# Patient Record
Sex: FEMALE | Race: WHITE | NOT HISPANIC OR LATINO | Employment: FULL TIME | URBAN - METROPOLITAN AREA
[De-identification: names, ages, dates, MRNs, and addresses within clinical notes are randomized per-mention and may not be internally consistent; named-entity substitution may affect disease eponyms.]

---
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
## 2018-01-02 ENCOUNTER — OFFICE VISIT (OUTPATIENT)
Dept: OBGYN | Facility: CLINIC | Age: 18
End: 2018-01-02
Payer: COMMERCIAL

## 2018-01-02 VITALS
DIASTOLIC BLOOD PRESSURE: 83 MMHG | TEMPERATURE: 98 F | SYSTOLIC BLOOD PRESSURE: 135 MMHG | HEART RATE: 86 BPM | WEIGHT: 225 LBS | BODY MASS INDEX: 35.77 KG/M2

## 2018-01-02 DIAGNOSIS — Z30.09 ENCOUNTER FOR COUNSELING REGARDING CONTRACEPTION: Primary | ICD-10-CM

## 2018-01-02 PROCEDURE — 99203 OFFICE O/P NEW LOW 30 MIN: CPT | Performed by: OBSTETRICS & GYNECOLOGY

## 2018-01-02 NOTE — MR AVS SNAPSHOT
After Visit Summary   1/2/2018    Sivan Mathew    MRN: [id number]           Patient Information     Date Of Birth          2000        Visit Information        Provider Department      1/2/2018 11:30 AM Catherine Fitzpatrick MD [facility]        Today's Diagnoses     Encounter for counseling regarding contraception    -  1       Follow-ups after your visit        Who to contact     If you have questions or need follow up information about today's clinic visit or your schedule please contact [facility] directly at [phone].  Normal or non-critical lab and imaging results will be communicated to you by Twelixirhart, letter or phone within 4 business days after the clinic has received the results. If you do not hear from us within 7 days, please contact the clinic through [employer] or phone. If you have a critical or abnormal lab result, we will notify you by phone as soon as possible.  Submit refill requests through [employer] or call your pharmacy and they will forward the refill request to us. Please allow 3 business days for your refill to be completed.          Additional Information About Your Visit        MyChart Information     [employer] gives you secure access to your electronic health record. If you see a primary care provider, you can also send messages to your care team and make appointments. If you have questions, please call your primary care clinic.  If you do not have a primary care provider, please call [phone] and they will assist you.        Care EveryWhere ID     This is your Care EveryWhere ID. This could be used by other organizations to access your [facility] medical records  [id number]        Your Vitals Were     Pulse Temperature Last Period BMI (Body Mass Index)          86 98  F (36.7  C) (Oral) 12/28/2017 35.77 kg/m2         Blood Pressure from Last 3 Encounters:   01/02/18 135/83   09/09/17 126/87   03/16/15 116/70    Weight from Last 3  Encounters:   01/02/18 225 lb (102.1 kg) (99 %)*   03/16/15 203 lb 2 oz (92.1 kg) (98 %)*   10/17/14 187 lb (84.8 kg) (97 %)*     * Growth percentiles are based on [facility] 2-20 Years data.              Today, you had the following     No orders found for display       Primary Care Provider Office Phone # Fax #    Daly Caridad Rodriges PA-C [phone] [address] [facility] [address]        Equal Access to Services     [facility]rem[facility] HospitalLINDA : Hadii silas beckman hadasho Sorajesh, waaxda luqadaha, qaybta kaalmada adehadley, edel boyle . So [facility] [phone].    ATENCIÓN: Si habla español, tiene a gonzalez disposición servicios gratuitos de asistencia lingüística. LlTriHealth [phone].    We comply with applicable federal civil rights laws and [place] laws. We do not discriminate on the basis of race, color, national origin, age, disability, sex, sexual orientation, or gender identity.            Thank you!     Thank you for choosing [facility]  for your care. Our goal is always to provide you with excellent care. Hearing back from our patients is one way we can continue to improve our services. Please take a few minutes to complete the written survey that you may receive in the mail after your visit with us. Thank you!             Your Updated Medication List - Protect others around you: Learn how to safely use, store and throw away your medicines at www.disposemymeds.org.      Notice  As of 1/2/2018 11:59 PM    You have not been prescribed any medications.

## 2018-01-02 NOTE — PROGRESS NOTES
"GYN Clinic Visit    Date of visit: 2018     Chief Complaint:   Chief Complaint   Patient presents with     Consult     contraception       HPI:   Sivan Mathew is a 17 year old  who presents to clinic today to discuss contraception options    Sivan is looking mainly for cycle control, as she's not currently sexually active.  Thinking she's most interested in IUD, Nexplanon.    Reports her periods are \"horrible.\"  She bleeds every 2-3 weeks, lasting 6-7 days.  The first day she has horrible cramps and reports she has difficulty doing her normal daily activities.      Medications:    No current outpatient prescriptions on file prior to visit.  No current facility-administered medications on file prior to visit.     Allergy:  No Known Allergies  Patient denies food, latex or environmental allergies.     Obstetric History:   Obstetric History       T0      L0     SAB0   TAB0   Ectopic0   Multiple0   Live Births0           Gynecologic History:  Menarche: 10  Menses: occur every 14-21 days lasting 6 days in duration.   Patient's last menstrual period was 2017.  STI history: none  Last Pap: n/a  History of abnormal pap: n/a  History of cervical procedures: n/a   Contraceptive History: none  The patient has never had sexual intercourse.        Past Medical History:   Diagnosis Date     Appendicitis - 11       Past Surgical History:   Procedure Laterality Date     LAPAROSCOPIC APPENDECTOMY CHILD  11    this was PRESUMABLY done at [facility] - we did not get op report       Social History:  Alcohol use    Alcohol use No     Tobacco use  History   Smoking Status     Never Smoker   Smokeless Tobacco     Never Used     Drug use  History   Drug Use No     Sexual history  History   Sexual Activity     Sexual activity: No     Relationship status is: single  Lives in apartment, alone.     Employment: Sustainable systems management at [employer]  Also works at [employer].  History of sexual, physical or mental abuse: No.     Exercise:   Participates in regular exercise: jogging every other day.   Obstacles to exercise are busy schedule    Family History   Problem Relation Age of Onset     CANCER Paternal Grandmother      breast - great grandma     Hypertension Paternal Grandmother      Lipids Paternal Grandmother      Myocardial Infarction Maternal Grandfather      great grandpa     Hypertension Maternal Grandfather      Lipids Maternal Grandfather      CANCER Maternal Aunt      blood - great aunt     Obesity Other      lots on both sides     Other - See Comments Other      smoking - lots of family     Denies hx of Breast, ovarian, or colon cancer, sudden death, early cardiovascular or thomboembolic disease. Denies hx or congenital anomalies and autoimmune diseases.    Past Anesthesia History:  Complications with general anesthesia: none.       Physical Exam:  Vitals:    18 1119   BP: 135/83   Pulse: 86   Temp: 98  F (36.7  C)   TempSrc: Oral   Weight: 225 lb (102.1 kg)       Assessment:  Sivan Mathew is a 17 year old  who presents with chief complaint Chief Complaint   Patient presents with     Consult     contraception       Plan:   - Briefly discussed Paragard IUD: mechanism of action, duration, bleeding profile, side effects, efficacy.  As she's looking for cycle control, we discussed this wasn't the best option for her.  - Discussed OCPs, Patch, NuvaRing. Discussed side effects, efficacy, delivery system.  Patient hoping for long acting method.  - Discussed Nexplanon: mechanism of action, duration, bleeding profile, side effects, efficacy.  - Discussed hormonal IUD: mechanism of action, duration, bleeding profile, side effects, efficacy. Discussed expulsion, perforation. Discussed minimal systemic hormonal absorption.  - Discussed that only condoms can prevent STI transmission of certain STIs.   - At conclusion of consultation patient feels she will  proceed with Mirena IUD placement.      Reviewed insertion procedure for Mirena, including risks.  Encouraged her to schedule placement near the end of her next menses and to take Ibuprofen 600mg PO 1 hour prior to her appointment.    Catherine Fitzpatrick MD    Please note greater than 50% of this 30 minute appointment were spent in counseling with the patient on issues described above in the history of present illness and in the plan, including evaluation and management of heavy cycles, and LARC methods..

## 2018-01-02 NOTE — NURSING NOTE
"Chief Complaint   Patient presents with     Consult     contraception       Initial /83  Pulse 86  Temp 98  F (36.7  C) (Oral)  Wt 225 lb (102.1 kg)  LMP 12/28/2017  BMI 35.77 kg/m2 Estimated body mass index is 35.77 kg/(m^2) as calculated from the following:    Height as of 3/16/15: 5' 6.5\" (1.689 m).    Weight as of this encounter: 225 lb (102.1 kg).  BP completed using cuff size: regular    No obstetric history on file.    The following HM Due: NONE      The following patient reported/Care Every where data was sent to:  P ABSTRACT QUALITY INITIATIVES [94712]  JUAN LUIS Cabral           "

## 2018-01-15 ENCOUNTER — OFFICE VISIT (OUTPATIENT)
Dept: OBGYN | Facility: CLINIC | Age: 18
End: 2018-01-15
Payer: COMMERCIAL

## 2018-01-15 VITALS
DIASTOLIC BLOOD PRESSURE: 79 MMHG | BODY MASS INDEX: 36 KG/M2 | OXYGEN SATURATION: 99 % | HEIGHT: 66 IN | SYSTOLIC BLOOD PRESSURE: 129 MMHG | WEIGHT: 224 LBS | HEART RATE: 117 BPM

## 2018-01-15 DIAGNOSIS — Z30.430 ENCOUNTER FOR IUD INSERTION: Primary | ICD-10-CM

## 2018-01-15 LAB — BETA HCG QUAL IFA URINE: NEGATIVE

## 2018-01-15 PROCEDURE — 58300 INSERT INTRAUTERINE DEVICE: CPT | Performed by: OBSTETRICS & GYNECOLOGY

## 2018-01-15 PROCEDURE — 84703 CHORIONIC GONADOTROPIN ASSAY: CPT | Performed by: OBSTETRICS & GYNECOLOGY

## 2018-01-15 NOTE — NURSING NOTE
"Chief Complaint   Patient presents with     IUD       Initial /79  Pulse 117  Ht 5' 6\" (1.676 m)  Wt 224 lb (101.6 kg)  LMP 2017  SpO2 99%  Breastfeeding? No  BMI 36.15 kg/m2 Estimated body mass index is 36.15 kg/(m^2) as calculated from the following:    Height as of this encounter: 5' 6\" (1.676 m).    Weight as of this encounter: 224 lb (101.6 kg).  BP completed using cuff size: large        The following HM Due: NONE      The following patient reported/Care Every where data was sent to:  P ABSTRACT QUALITY INITIATIVES [08351]  none      n/a              "

## 2018-01-15 NOTE — MR AVS SNAPSHOT
"              After Visit Summary   1/15/2018    Sivan Mathew    MRN: [id number]           Patient Information     Date Of Birth          2000        Visit Information        Provider Department      1/15/2018 11:30 AM Shama Caballero MD [facility] [place] [facility]        Today's Diagnoses     Encounter for IUD insertion    -  1       Follow-ups after your visit        Who to contact     If you have questions or need follow up information about today's clinic visit or your schedule please contact [facility] directly at [phone].  Normal or non-critical lab and imaging results will be communicated to you by Crowdcubehart, letter or phone within 4 business days after the clinic has received the results. If you do not hear from us within 7 days, please contact the clinic through [employer] or phone. If you have a critical or abnormal lab result, we will notify you by phone as soon as possible.  Submit refill requests through Toutpost or call your pharmacy and they will forward the refill request to us. Please allow 3 business days for your refill to be completed.          Additional Information About Your Visit        MyChart Information     Toutpost gives you secure access to your electronic health record. If you see a primary care provider, you can also send messages to your care team and make appointments. If you have questions, please call your primary care clinic.  If you do not have a primary care provider, please call [phone] and they will assist you.        Care EveryWhere ID     This is your Care EveryWhere ID. This could be used by other organizations to access your [facility] medical records  [id number]        Your Vitals Were     Pulse Height Last Period Pulse Oximetry Breastfeeding? BMI (Body Mass Index)    117 5' 6\" (1.676 m) 12/28/2017 99% No 36.15 kg/m2       Blood Pressure from Last 3 Encounters:   01/15/18 129/79   01/02/18 135/83   09/09/17 126/87    Weight from Last 3 " Encounters:   01/15/18 224 lb (101.6 kg)   01/02/18 225 lb (102.1 kg) (99 %)*   03/16/15 203 lb 2 oz (92.1 kg) (98 %)*     * Growth percentiles are based on [facility] 2-20 Years data.              We Performed the Following     Beta HCG qual IFA urine     INSERTION INTRAUTERINE DEVICE          Today's Medication Changes          These changes are accurate as of: 1/15/18 11:56 AM.  If you have any questions, ask your nurse or doctor.               Start taking these medicines.        Dose/Directions    levonorgestrel 20 MCG/24HR IUD   Commonly known as:  MIRENA   Used for:  Encounter for IUD insertion   Started by:  Shama Caballero MD        Dose:  1 each   1 each (20 mcg) by Intrauterine route once for 1 dose   Quantity:  1 each   Refills:  0            Where to get your medicines      Some of these will need a paper prescription and others can be bought over the counter.  Ask your nurse if you have questions.     You don't need a prescription for these medications     levonorgestrel 20 MCG/24HR IUD                Primary Care Provider Office Phone # Fax #    Daly Caridad Rodriges PA-C [phone]       [facility] [place] [phone]        Equal Access to Services     LENNOX VILLALBA AH: Hadii aad ku hadasho Soomaali, waaxda luqadaha, qaybta kaalmada adeegyada, edel arryoo. So [facility] [phone].    ATENCIÓN: Si habla español, tiene a gonzalez disposición servicios gratuitos de asistencia lingüística. Llame al [phone].    We comply with applicable federal civil rights laws and [place] laws. We do not discriminate on the basis of race, color, national origin, age, disability, sex, sexual orientation, or gender identity.            Thank you!     Thank you for choosing [facility]  for your care. Our goal is always to provide you with excellent care. Hearing back from our patients is one way we can continue to improve our services. Please take a few minutes to  complete the written survey that you may receive in the mail after your visit with us. Thank you!             Your Updated Medication List - Protect others around you: Learn how to safely use, store and throw away your medicines at www.disposemymeds.org.          This list is accurate as of: 1/15/18 11:56 AM.  Always use your most recent med list.                   Brand Name Dispense Instructions for use Diagnosis    levonorgestrel 20 MCG/24HR IUD    MIRENA    1 each    1 each (20 mcg) by Intrauterine route once for 1 dose    Encounter for IUD insertion

## 2018-01-15 NOTE — PROGRESS NOTES
"Sivan Mathew is a 18 year old female who presents for IUD insertion.  See previous GYN note with Dr. Fitzpatrick.  We reviewed risks and benefits, and her questions were answered.  LMP was 3 weeks ago, she denies any history of sexual contact, wants the IUD for menstrual control.      OBJECTIVE: healthy female in NAD.  /79  Pulse 117  Ht 5' 6\" (1.676 m)  Wt 224 lb (101.6 kg)  LMP 12/28/2017  SpO2 99%  Breastfeeding? No  BMI 36.15 kg/m2.  The uterus was normal sized, mid-position.  Speculum was placed in the vagina, and hibiclens prep used.  The cervix was grasped anteriorly with a tenaculum. The uterus sounded to 7 1/2 cm.  A tapered blue dilator was used to traverse the internal cervical os. Using standard technique, a Mirena IUD was placed in the endometrial cavity without difficulty.  The string was trimmed.  The instruments were removed.  She tolerated the procedure well.    ASSESSMENT: IUD placement.    PLAN: RTC routinely or prn.  Standard precautions.    "

## 2018-05-07 ENCOUNTER — OFFICE VISIT (OUTPATIENT)
Dept: PSYCHOLOGY | Facility: CLINIC | Age: 18
End: 2018-05-07
Payer: COMMERCIAL

## 2018-05-07 DIAGNOSIS — F33.1 MAJOR DEPRESSIVE DISORDER, RECURRENT, MODERATE (H): ICD-10-CM

## 2018-05-07 DIAGNOSIS — F41.1 GENERALIZED ANXIETY DISORDER: Primary | ICD-10-CM

## 2018-05-07 PROCEDURE — 90834 PSYTX W PT 45 MINUTES: CPT | Performed by: COUNSELOR

## 2018-05-07 ASSESSMENT — ANXIETY QUESTIONNAIRES
2. NOT BEING ABLE TO STOP OR CONTROL WORRYING: SEVERAL DAYS
5. BEING SO RESTLESS THAT IT IS HARD TO SIT STILL: NOT AT ALL
IF YOU CHECKED OFF ANY PROBLEMS ON THIS QUESTIONNAIRE, HOW DIFFICULT HAVE THESE PROBLEMS MADE IT FOR YOU TO DO YOUR WORK, TAKE CARE OF THINGS AT HOME, OR GET ALONG WITH OTHER PEOPLE: SOMEWHAT DIFFICULT
GAD7 TOTAL SCORE: 5
1. FEELING NERVOUS, ANXIOUS, OR ON EDGE: MORE THAN HALF THE DAYS
6. BECOMING EASILY ANNOYED OR IRRITABLE: NOT AT ALL
3. WORRYING TOO MUCH ABOUT DIFFERENT THINGS: SEVERAL DAYS
7. FEELING AFRAID AS IF SOMETHING AWFUL MIGHT HAPPEN: SEVERAL DAYS

## 2018-05-07 ASSESSMENT — PATIENT HEALTH QUESTIONNAIRE - PHQ9: 5. POOR APPETITE OR OVEREATING: NOT AT ALL

## 2018-05-07 NOTE — MR AVS SNAPSHOT
MRN:[id number]                      After Visit Summary   5/7/2018    Sivan Mathew    MRN: [id number]           Visit Information        Provider Department      5/7/2018 1:00 PM [facility] Generic      Your next 10 appointments already scheduled     May 14, 2018  5:30 PM CDT   [facility]   [facility] ([facility])    [facility]  [address] [place], [place] [address]  [facility] [id number] [phone]              MyChart Information     [employer] gives you secure access to your electronic health record. If you see a primary care provider, you can also send messages to your care team and make appointments. If you have questions, please call your primary care clinic.  If you do not have a primary care provider, please call [phone] and they will assist you.        Care EveryWhere ID     This is your Care EveryWhere ID. This could be used by other organizations to access your [place] medical records  [id number]        Equal Access to Services     LENNOX VILLALBA : Hadii aad ku hadasho Sosoraidaali, waaxda luqadaha, qaybta kaalmada adeegmick, edel arroyo. So [facility] [phone].    ATENCIÓN: Si habla español, tiene a gonzalez disposición servicios gratuitos de asistencia lingüística. Llame al [phone].    We comply with applicable federal civil rights laws and [place] laws. We do not discriminate on the basis of race, color, national origin, age, disability, sex, sexual orientation, or gender identity.

## 2018-05-07 NOTE — Clinical Note
Sivan Faria completed first intake appt for therapy. She currently meets criteria for Major Depressive Disorder, Recurrent Episode, Moderate & Generalized Anxiety Disorder. Please contact me with any questions or concerns.   Thank you, Ning Montiel MA, [facility]

## 2018-05-07 NOTE — PROGRESS NOTES
"               Progress Note - Initial Session    Client Name:  Sivan Mathew Date: 5/7/2018         Service Type: Individual      Session Start Time: 1:00p  Session End Time: 1:45p      Session Length: 38 - 52      Session #: 1     Attendees: Client attended alone         Diagnostic Assessment in progress.  Unable to complete documentation at the conclusion of the first session due to addressing depression and anxiety symptoms.       Mental Status Assessment:  Appearance:   Appropriate   Eye Contact:   Good   Psychomotor Behavior: Normal   Attitude:   Cooperative   Orientation:   All  Speech   Rate / Production: Normal    Volume:  Normal   Mood:    Anxious  Normal  Affect:    Appropriate   Thought Content:  Clear   Thought Form:  Coherent  Goal Directed  Logical   Insight:    Good       Safety Issues and Plan for Safety and Risk Management:  Client reports the following current fears or concerns for personal safety: lives in off campus housing.  Client reports the following current or recent suicidal ideation or behaviors: onset - middle school, reports low self esteem, weight issues, and experienced bullying; states SI comes in waves and continued into college - no SI freshman year, but this year SI has worsened - hopelessness for the future, \"it's not worth it, what's the point, you haven't done anything, you won't do anything, accomplish anything\"; denies attempts, hospitalizations, plans; hardik by distracting, hanging out with friends, and getting \"really into something like watching a show\".   Client denies current or recent homicidal ideation or behaviors.  Client reports current or recent self injurious behavior or ideation including onset - high school, superficial cutting, no scars, last self-harm Sept 2017.  Client denies other safety concerns.  A safety and risk management plan has not been developed at this time, however client was given the after-hours number / 911 should there be a change in any " of these risk factors. She also agreed to call her mother for support as needed.  Client reports there are no firearms in the house.      Diagnostic Criteria:  A. Excessive anxiety and worry about a number of events or activities (such as work or school performance).   B. The person finds it difficult to control the worry.  C. Select 3 or more symptoms (required for diagnosis). Only one item is required in children.   - Restlessness or feeling keyed up or on edge.    - Being easily fatigued.    - Difficulty concentrating or mind going blank.    - Sleep disturbance (difficulty falling or staying asleep, or restless unsatisfying sleep).   D. The focus of the anxiety and worry is not confined to features of an Axis I disorder.  E. The anxiety, worry, or physical symptoms cause clinically significant distress or impairment in social, occupational, or other important areas of functioning.   F. The disturbance is not due to the direct physiological effects of a substance (e.g., a drug of abuse, a medication) or a general medical condition (e.g., hyperthyroidism) and does not occur exclusively during a Mood Disorder, a Psychotic Disorder, or a Pervasive Developmental Disorder.  CRITERIA (A-C) REPRESENT A MAJOR DEPRESSIVE EPISODE - SELECT THESE CRITERIA  A) Recurrent episode(s) - symptoms have been present during the same 2-week period and represent a change from previous functioning 5 or more symptoms (required for diagnosis)   - Depressed mood. Note: In children and adolescents, can be irritable mood.     - Poor appetite.    - Poor sleep.    - Psychomotor activity agitation.    - Fatigue or loss of energy.    - Feelings of worthlessness or inappropriate and excessive guilt.    - Diminished ability to think or concentrate, or indecisiveness.    - Recurrent thoughts of death (not just fear of dying), recurrent suicidal ideation without a specific plan, or a suicide attempt or a specific plan for committing suicide.   B) The  symptoms cause clinically significant distress or impairment in social, occupational, or other important areas of functioning  C) The episode is not attributable to the physiological effects of a substance or to another medical condition  D) The occurence of major depressive episode is not better explained by other thought / psychotic disorders  E) There has never been a manic episode or hypomanic episode      DSM5 Diagnoses: (Sustained by DSM5 Criteria Listed Above)  Diagnoses: 296.32 (F33.1) Major Depressive Disorder, Recurrent Episode, Moderate & 300.02 (F41.1) Generalized Anxiety Disorder; Hx Anorexia Nervosa    Psychosocial & Contextual Factors: School stress  WHODAS 2.0 (12 item)            This questionnaire asks about difficulties due to health conditions. Health conditions  include  disease or illnesses, other health problems that may be short or long lasting,  injuries, mental health or emotional problems, and problems with alcohol or drugs.                     Think back over the past 30 days and answer these questions, thinking about how much  difficulty you had doing the following activities. For each question, please Teller only  one response.    S1 Standing for long periods such as 30 minutes? None =         1   S2 Taking care of household responsibilities? None =         1   S3 Learning a new task, for example, learning how to get to a new place? None =         1   S4 How much of a problem do you have joining community activities (for example, festivals, Adventism or other activities) in the same way as anyone else can? None =         1   S5 How much have you been emotionally affected by your health problems? Severe =       4     In the past 30 days, how much difficulty did you have in:   S6 Concentrating on doing something for ten minutes? Mild =           2   S7 Walking a long distance such as a kilometer (or equivalent)? None =         1   S8 Washing your whole body? None =         1   S9 Getting  dressed? None =         1   S10 Dealing with people you do not know? None =         1   S11 Maintaining a friendship? None =         1   S12 Your day to day work? None =         1     H1 Overall, in the past 30 days, how many days were these difficulties present? Record number of days 10   H2 In the past 30 days, for how many days were you totally unable to carry out your usual activities or work because of any health condition? Record number of days 0   H3 In the past 30 days, not counting the days that you were totally unable, for how many days did you cut back or reduce your usual activities or work because of any health condition? Record number of days 0       Collateral Reports Completed:  Routed note to PCP      PLAN: (Homework, other):  Client stated that she may follow up for ongoing services with [facility].  Continue to assess depression and anxiety symptoms as well as alcohol use and history of Anorexia Nervosa.       Ning Montiel, [facility]

## 2018-05-08 ASSESSMENT — PATIENT HEALTH QUESTIONNAIRE - PHQ9: SUM OF ALL RESPONSES TO PHQ QUESTIONS 1-9: 10

## 2018-05-08 ASSESSMENT — ANXIETY QUESTIONNAIRES: GAD7 TOTAL SCORE: 5

## 2018-05-14 ENCOUNTER — OFFICE VISIT (OUTPATIENT)
Dept: PSYCHOLOGY | Facility: CLINIC | Age: 18
End: 2018-05-14
Payer: COMMERCIAL

## 2018-05-14 DIAGNOSIS — F33.1 MAJOR DEPRESSIVE DISORDER, RECURRENT, MODERATE (H): ICD-10-CM

## 2018-05-14 DIAGNOSIS — F41.1 GENERALIZED ANXIETY DISORDER: Primary | ICD-10-CM

## 2018-05-14 PROCEDURE — 90791 PSYCH DIAGNOSTIC EVALUATION: CPT | Performed by: COUNSELOR

## 2018-05-14 NOTE — Clinical Note
Sivan Faria completed intake appt for therapy. We will be working on depression and anxiety. Please contact me with any questions or concerns.   Thank you, Ning Montiel MA, [facility]

## 2018-05-14 NOTE — MR AVS SNAPSHOT
MRN:[id number]                      After Visit Summary   5/14/2018    Sivan Mathew    MRN: [id number]           Visit Information        Provider Department      5/14/2018 5:30 PM Tiana Ning [facility] Generic      Your next 10 appointments already scheduled     Jun 05, 2018  3:30 PM CDT   Return Visit with Lisaidalia Montiel [facility] ([facility]  [address] [facility]  [facility] [id number] [phone]            Jun 19, 2018  5:30 PM CDT   Return Visit with Lisaidalia Montiel [facility] ([facility]  [address]  [facility] [id number] [phone]            Jul 03, 2018  5:30 PM CDT   Return Visit with Lisaidalia Montiel [facility] ([facility]  [address] [place], [place] [address]  [facility] [id number] [phone]              MyChart Information     [employer] gives you secure access to your electronic health record. If you see a primary care provider, you can also send messages to your care team and make appointments. If you have questions, please call your primary care clinic.  If you do not have a primary care provider, please call [phone] and they will assist you.        Care EveryWhere ID     This is your Care EveryWhere ID. This could be used by other organizations to access your [place] medical records  [id number]        Equal Access to Services     LENNOX VILLALBA AH: Hadii aad ku hadasho Soomaali, waaxda luqadaha, qaybta kaalmada adeegyada, waxkalpesh posadasin hayaan shaynealan boyle . So [facility] [phone].    ATENCIÓN: Si habla español, tiene a gonzalez disposición servicios gratuitos de asistencia lingüística. Llame al [phone].    We comply with applicable federal civil rights laws and [place] laws. We do not discriminate on the basis of race,  color, national origin, age, disability, sex, sexual orientation, or gender identity.

## 2018-05-16 NOTE — PROGRESS NOTES
"                                                                                                                                                                      Adult Intake Structured Interview  Standard Diagnostic Assessment      CLIENT'S NAME: Sivan Mathew  MRN:   [id number]  :   2000  ACCT. NUMBER: [id number]  DATE OF SERVICE: 18      Identifying Information:  Client is a 18 year old, , newly partnered / significant other female. Client was referred for counseling by self. Client is currently a student. Client attended the session alone.       Client's Statement of Presenting Concern:  Client reports the reason for seeking therapy at this time as \"increased anxiety, just want to talk to someone\". Also endorsed depressive symptoms, SI (feeling like she is wasting her time in college - no activities, lacking friendships) as well as relationship problems - first gf broke up with her 2017. Client stated that her symptoms have resulted in the following functional impairments: academic performance, management of the household and or completion of tasks, organization, relationship(s), self-care, social interactions and work / vocational responsibilities.      History of Presenting Concern:  Client reports that these problem(s) began in middle school for depression (reports low self-esteem, weight issues, and experienced bullying) and senior in high school for anxiety. Also reports a history of anorexia nervosa afshin/ year in high school (restricting/skipping meals when she is stressed, substitutes eating with drinking water). Client has attempted to resolve these concerns in the past through therapy, distraction, and practicing empathy. Client reports that other professional(s) are not involved in providing support / services.       Social History:  Client reported she grew up in [facility]. They were the first born of 2 children. This is an intact family and parents " "remain . Client reported that her childhood was \"very good, good education, middle class\". Recalled enjoying summer trips, but staying with family most of the time.  family does not communicate about problems or emotions. She came out to her family soph-jun year in high school and to the community in 2017.  mother initially had a difficult time, but is accepting of her now. Client described her current relationships with family of origin as \"very good, we all talk often, I am living at college and they all live in the same house at home\".  father is \"fanastic, grounding\", mother is \"empathetic, sensitive, has low esteem, can be hard to be around at times\", and has a \"very good relationship\" with brother.     Client reported a history of 1 relationship - client seemed confused about how the relationship went, described relationship to be \"cute, fun, good first relationship\", but was told at the end of the relationship that gf did not actually like her. Client has been partnered / significant other for a few weeks - client plans to end relationship soon as it is not a good fit. Client reported having 0 children. Client identified some stable and meaningful social connections. Client reported that she has not been involved with the legal system. Client's highest education level was some college. Client did identify the following learning problems: concentration. There are no ethnic, cultural or Religion factors that may be relevant for therapy - \"I am very Alevism and that is important to me\". Client identified her preferred language to be English. Client reported she does not need the assistance of an  or other support involved in therapy. Modifications will not be used to assist communication in therapy. Client did not serve in the .     Client reports family history includes CANCER in her maternal aunt and paternal grandmother; Hypertension in her maternal grandfather " and paternal grandmother; Lipids in her maternal grandfather and paternal grandmother; Myocardial Infarction in her maternal grandfather; Obesity in an other family member; Other - See Comments in an other family member.      Mental Health History:  Client reported the following biological family members or relatives with mental health issues: Father and Mother's side of the family both experiences depression, and Mother experiences Anxiety and Depression. Client previously received the following mental health diagnosis: Anxiety, Depression and an Eating disorder.  Client has received the following mental health services in the past: counseling and medication(s) from physician / PCP.  Hospitalizations: None.  Client is not currently receiving any mental health services.      Chemical Health History:  Client reported the following biological family members or relatives with chemical health issues: Father's side of the family reportedly uses alcohol. Client has not received chemical dependency treatment in the past. Client is not currently receiving any chemical dependency treatment. Client reported the following problems as a result of drinking: detox.      Client Reports:  Client reports using alcohol 1 times per week and has 1-2 drinks at a time. Client first started drinking at age 17.  Client denies using tobacco.  Client denies using marijuana.  Client reports using caffeine 5 times per week and drinks 1 at a time. Client started using caffeine at age 17.  Client denies using street drugs.  Client denies the non-medical use of prescription or over the counter drugs.    CAGE: None of the patient's responses to the CAGE screening were positive / Negative CAGE score   Based on the negative Cage-Aid score and clinical interview there are not indications of drug or alcohol abuse.    Discussed the general effects of drugs and alcohol on health and well-being. Therapist gave client printed information about the effects  of chemical use on her health and well being.      Significant Losses / Trauma / Abuse / Neglect Issues:  There are indications or report of significant loss, trauma, abuse or neglect issues related to: break with first gf Sept 2017.    Issues of possible neglect are not present.      Medical Issues:  Client has not had a physical exam to rule out medical causes for current symptoms. Date of last physical exam was greater than a year ago and client was encouraged to schedule an exam with PCP. The client has a [facility] Provider, who is named Daly Rodriges. The client reports not having a psychiatrist. Client reports no current medical concerns. The client denies the presence of chronic or episodic pain. There are significant nutritional concerns - hx of anorexia nervosa.    Client reports current meds as:   Current Outpatient Prescriptions   Medication Sig     levonorgestrel (MIRENA) 20 MCG/24HR IUD 1 each (20 mcg) by Intrauterine route once for 1 dose     No current facility-administered medications for this visit.        Client Allergies:  No Known Allergies      Medical History:  Past Medical History:   Diagnosis Date     Appendicitis - 8/20/11 9/19/2011       Medication Adherence:  N/A - Client does not have prescribed psychiatric medications.    Client was provided recommendation to follow-up with prescribing physician.      Mental Status Assessment:  Appearance:   Appropriate   Eye Contact:   Good   Psychomotor Behavior: Normal   Attitude:   Cooperative   Orientation:   All  Speech   Rate / Production: Normal    Volume:  Normal   Mood:    Anxious   Affect:    Appropriate  Bright  Worrisome   Thought Content:  Clear   Thought Form:  Coherent  Logical  Circumstantial  Insight:    Fair       Review of Symptoms:  Depression: Sleep Guilt Energy Concentration Appetite Psychomotor slowing or agitation Suicide Hopeless Helpless Worthless Ruminations Irritability  Awa:  No symptoms  Psychosis: No  "symptoms  Anxiety: Worries Nervousness  Panic:  Palpitations Sense of Impending Doom  Post Traumatic Stress Disorder: No symptoms  Obsessive Compulsive Disorder: No symptoms  Eating Disorder: Restriction  Oppositional Defiant Disorder: No symptoms  ADD / ADHD: No symptoms  Conduct Disorder: No symptoms      Safety Assessment:  Client reports the following current fears or concerns for personal safety: lives in off campus housing.  Client reports the following current or recent suicidal ideation or behaviors: onset - middle school, reports low self esteem, weight issues, and experienced bullying; states SI comes in waves and continued into college - no SI freshman year, but this year SI has worsened - hopelessness for the future, \"it's not worth it, what's the point, you haven't done anything, you won't do anything, accomplish anything\"; denies attempts, hospitalizations, plans; hardik by distracting, hanging out with friends, and getting \"really into something like watching a show\".   Client denies current or recent homicidal ideation or behaviors.  Client reports current or recent self injurious behavior or ideation including onset - high school, superficial cutting, no scars, last self-harm Sept 2017.  Client denies other safety concerns.  A safety and risk management plan has not been developed at this time, however client was given the after-hours number / 911 should there be a change in any of these risk factors. She also agreed to call her mother for support as needed.  Client reports there are no firearms in the house.      Client's Strengths and Limitations:  Client identified the following strengths or resources that will help her succeed in counseling: Druze, deborah / spirituality, friends / good social support and family support. Client identified the following supports: family, Anabaptism / spirituality and friends. Things that may interfere with the client's success in counseling include: financial " hardship and busy schedule.      Diagnostic Criteria:  A. Excessive anxiety and worry about a number of events or activities (such as work or school performance).   B. The person finds it difficult to control the worry.  C. Select 3 or more symptoms (required for diagnosis). Only one item is required in children.   - Restlessness or feeling keyed up or on edge.    - Being easily fatigued.    - Difficulty concentrating or mind going blank.    - Sleep disturbance (difficulty falling or staying asleep, or restless unsatisfying sleep).   D. The focus of the anxiety and worry is not confined to features of an Axis I disorder.  E. The anxiety, worry, or physical symptoms cause clinically significant distress or impairment in social, occupational, or other important areas of functioning.   F. The disturbance is not due to the direct physiological effects of a substance (e.g., a drug of abuse, a medication) or a general medical condition (e.g., hyperthyroidism) and does not occur exclusively during a Mood Disorder, a Psychotic Disorder, or a Pervasive Developmental Disorder.  CRITERIA (A-C) REPRESENT A MAJOR DEPRESSIVE EPISODE - SELECT THESE CRITERIA  A) Recurrent episode(s) - symptoms have been present during the same 2-week period and represent a change from previous functioning 5 or more symptoms (required for diagnosis)   - Depressed mood. Note: In children and adolescents, can be irritable mood.     - Poor appetite.    - Poor sleep.    - Psychomotor activity agitation.    - Fatigue or loss of energy.    - Feelings of worthlessness or inappropriate and excessive guilt.    - Diminished ability to think or concentrate, or indecisiveness.    - Recurrent thoughts of death (not just fear of dying), recurrent suicidal ideation without a specific plan, or a suicide attempt or a specific plan for committing suicide.   B) The symptoms cause clinically significant distress or impairment in social, occupational, or other important  areas of functioning  C) The episode is not attributable to the physiological effects of a substance or to another medical condition  D) The occurence of major depressive episode is not better explained by other thought / psychotic disorders  E) There has never been a manic episode or hypomanic episode       Functional Status:  Client's symptoms have caused reduced functional status in the following areas: Academics / Education, Activities of Daily Living, Occupational / Vocational, Social / Relational.      DSM5 Diagnoses: (Sustained by DSM5 Criteria Listed Above)  Diagnoses: 300.02 (F41.1) Generalized Anxiety Disorder & 296.32 (F33.1) Major Depressive Disorder, Recurrent Episode, Moderate; Hx Anorexia Nervosa    Psychosocial & Contextual Factors: School stress, relationship stress  WHODAS 2.0 (12 item)                          This questionnaire asks about difficulties due to health conditions. Health conditions                   include                        disease or illnesses, other health problems that may be short or long lasting,                    injuries, mental health or emotional problems, and problems with alcohol or drugs.                              Think back over the past 30 days and answer these questions, thinking about how much              difficulty you had doing the following activities. For each question, please [place] only                   one response.     S1 Standing for long periods such as 30 minutes? None =         1   S2 Taking care of household responsibilities? None =         1   S3 Learning a new task, for example, learning how to get to a new place? None =         1   S4 How much of a problem do you have joining community activities (for example, festivals, Taoist or other activities) in the same way as anyone else can? None =         1   S5 How much have you been emotionally affected by your health problems? Severe =       4           In the past 30 days, how much  difficulty did you have in:   S6 Concentrating on doing something for ten minutes? Mild =           2   S7 Walking a long distance such as a kilometer (or equivalent)? None =         1   S8 Washing your whole body? None =         1   S9 Getting dressed? None =         1   S10 Dealing with people you do not know? None =         1   S11 Maintaining a friendship? None =         1   S12 Your day to day work? None =         1      H1 Overall, in the past 30 days, how many days were these difficulties present? Record number of days 10   H2 In the past 30 days, for how many days were you totally unable to carry out your usual activities or work because of any health condition? Record number of days 0   H3 In the past 30 days, not counting the days that you were totally unable, for how many days did you cut back or reduce your usual activities or work because of any health condition? Record number of days 0        Attendance Agreement:  Client has signed Attendance Agreement:Yes      Collaboration:  Collaboration with other professionals is not indicated at this time.        Preliminary Treatment Plan:  The client reports no currently identified Zoroastrianism, ethnic or cultural issues relevant to therapy.     services are not indicated.    Modifications to assist communication are not indicated.    The concerns identified by the client will be addressed in therapy.    Initial Treatment will focus on: Depressed Mood and Anxiety .    As a preliminary treatment goal, client will experience a reduction in depressed mood, will develop more effective coping skills to manage depressive symptoms, will develop healthy cognitive patterns and beliefs and will increase ability to function adaptively and will experience a reduction in anxiety, will develop more effective coping skills to manage anxiety symptoms, will develop healthy cognitive patterns and beliefs and will increase ability to function adaptively.    The focus of  initial interventions will be to alleviate anxiety, alleviate depressed mood, facilitate appropriate expression of feelings, increase ability to function adaptively, increase coping skills, increase self esteem, provide homework to reinforce skill development, teach CBT skills, teach communication skills, teach conflict management skills, teach DBT skills, teach distress tolerance skills, teach emotional regulation, teach mindfulness skills, teach problem-solving skills, teach relaxation strategies, teach social skills and teach stress mangement techniques.    Referral to another professional/service is not indicated at this time.    A Release of Information is not needed at this time.    Report to child / adult protection services was NA.    Client will have access to their [facility]' medical record.    Ning Montiel [facility] Center[facility]EDERICA  May 14, 2018

## 2018-06-05 ENCOUNTER — OFFICE VISIT (OUTPATIENT)
Dept: PSYCHOLOGY | Facility: CLINIC | Age: 18
End: 2018-06-05
Payer: COMMERCIAL

## 2018-06-05 DIAGNOSIS — F41.1 GENERALIZED ANXIETY DISORDER: ICD-10-CM

## 2018-06-05 DIAGNOSIS — F33.1 MAJOR DEPRESSIVE DISORDER, RECURRENT, MODERATE (H): Primary | ICD-10-CM

## 2018-06-05 PROCEDURE — 90834 PSYTX W PT 45 MINUTES: CPT | Performed by: COUNSELOR

## 2018-06-05 ASSESSMENT — ANXIETY QUESTIONNAIRES
IF YOU CHECKED OFF ANY PROBLEMS ON THIS QUESTIONNAIRE, HOW DIFFICULT HAVE THESE PROBLEMS MADE IT FOR YOU TO DO YOUR WORK, TAKE CARE OF THINGS AT HOME, OR GET ALONG WITH OTHER PEOPLE: VERY DIFFICULT
7. FEELING AFRAID AS IF SOMETHING AWFUL MIGHT HAPPEN: SEVERAL DAYS
3. WORRYING TOO MUCH ABOUT DIFFERENT THINGS: MORE THAN HALF THE DAYS
6. BECOMING EASILY ANNOYED OR IRRITABLE: SEVERAL DAYS
2. NOT BEING ABLE TO STOP OR CONTROL WORRYING: MORE THAN HALF THE DAYS
5. BEING SO RESTLESS THAT IT IS HARD TO SIT STILL: SEVERAL DAYS
GAD7 TOTAL SCORE: 10
1. FEELING NERVOUS, ANXIOUS, OR ON EDGE: NEARLY EVERY DAY

## 2018-06-05 ASSESSMENT — PATIENT HEALTH QUESTIONNAIRE - PHQ9: 5. POOR APPETITE OR OVEREATING: NOT AT ALL

## 2018-06-05 NOTE — PROGRESS NOTES
Progress Note    Client Name: Sivan Mathew  Date: 6/5/2018         Service Type: Individual      Session Start Time: 3:35p  Session End Time: 4:15p      Session Length: 40 minutes     Session #: 3     Attendees: Client attended alone    Treatment Plan Last Reviewed: In progress  PHQ-9 / LEYLA-7 : 14 & 10     DATA      Progress Since Last Session (Related to Symptoms / Goals / Homework):   Symptoms: Worsened in the past few week, but stable today, see Epic for PHQ 9 and LEYLA 7 updates     Homework: Client will share safety plan with friend.       Episode of Care Goals: Minimal progress - PREPARATION (Decided to change - considering how); Intervened by negotiating a change plan and determining options / strategies for behavior change, identifying triggers, exploring social supports, and working towards setting a date to begin behavior change     Current / Ongoing Stressors and Concerns:   Created safety plan as well as identified treatment goals (see below).      Treatment Objective(s) Addressed in This Session:   In progress     Intervention:   CBT: identity and challenge self-defeating and maladaptive thoughts, self-talk, and behaviors, teach and reinforce proactive interpersonal communication and problem solving skills, identify emotions and potential underlying reasons/functions of emotions, teach assertiveness skills, reinforce self-awareness and proactive problem solving and help seeking behaviors; Motivational Interviewing: open-ended questions, affirmations, reflections, reinforce personal control and choices, challenge sustain talk, evoke change talk, reinforce natural coping skills, explore ambivalence and roadblocks to change         ASSESSMENT: Current Emotional / Mental Status (status of significant symptoms):   Risk status (Self / Other harm or suicidal ideation)   Client reports the following current fears or concerns for personal safety: lives [place] off campus  "housing.  Client reports the following current or recent suicidal ideation or behaviors: onset - middle school, reports low self esteem, weight issues, and experienced bullying; states SI comes in waves and continued into college - no SI freshman year, but this year SI has worsened - hopelessness for the future, \"it's not worth it, what's the point, you haven't done anything, you won't do anything, accomplish anything\"; denies attempts, hospitalizations, plans; hardik by distracting, hanging out with friends, and getting \"really into something like watching a show\".   Client denies current or recent homicidal ideation or behaviors.  Client reports current or recent self injurious behavior or ideation including onset - high school, superficial cutting, no scars, last self-harm Sept 2017.  Client denies other safety concerns.  A safety and risk management plan has been developed at this time, see below  Client reports there are no firearms in the house.     Appearance:   Appropriate    Eye Contact:   Good    Psychomotor Behavior: Normal    Attitude:   Cooperative    Orientation:   All   Speech    Rate / Production: Normal     Volume:  Normal    Mood:    Anxious    Affect:    Appropriate  Bright    Thought Content:  Clear  Rumination    Thought Form:  Coherent  Logical  Circumstantial   Insight:    Good      Medication Review:   No current psychiatric medications prescribed     Medication Compliance:   NA     Changes in Health Issues:   None reported     Chemical Use Review:   Substance Use: Chemical use reviewed, no active concerns identified      Tobacco Use: No current tobacco use     Collateral Reports Completed:   Not Applicable      PLAN: (Client Tasks / Therapist Tasks / Other)  Start to address issues identified on treatment plan.        Ning Montiel [facility]                                                         ________________________________________________________________________    Treatment Plan    Client's " Name: Sivan Mathew  YOB: 2000    Date: 6/5/2018    Diagnoses: 300.02 (F41.1) Generalized Anxiety Disorder & 296.32 (F33.1) Major Depressive Disorder, Recurrent Episode, Moderate; Hx Anorexia Nervosa  Psychosocial & Contextual Factors: School stress, relationship stress  WHODAS: 16    Referral / Collaboration:  Was/were discussed and client will pursue - see PCP for medication.    Anticipated number of session or this episode of care: 12      MeasurableTreatment Goal(s) related to diagnosis / functional impairment(s)  Goal 1: Client will improve mood as evidenced by decreased score on PHQ 9 from 10 (moderate) to 5 or less (mild).    I will know I've met my goal when I have fewer intrusive thoughts and more energy.      Objective #A (Client Action)    Client will identify and practice 3 new strategies for dealing with strong emotions as they relate to SI.  Status: New - Date: 6/5/2018     Intervention(s)  Therapist will assign homework of using safety plan; role-play effective communication skills; teach emotional regulation skills.    Objective #B  Client will feel less tired and more energy during the day.  Status: New - Date: 6/5/2018     Intervention(s)  Therapist will assign homework of social leisure planning and routine development; provide educational materials on what is self care; role-play assertiveness skills; teach emotional recognition/identification.     Goal 2: Client will better manage anxiety as evidenced by decreased score on LEYLA 7 from 5 to 0.     I will know I've met my goal when I have ewer anxious tendencies (talking loud, overly giggly and bright).      Objective #A (Client Action)    Client will learn 3 anxiety management strategies.  Status: New - Date: 6/5/2018     Intervention(s)  Therapist will assign homework of skill practice; provide educational materials on relaxation/mindfulness; role-play conflict management; teach the client how to perform a behavioral chain  "analysis.      Client has reviewed and agreed to the above plan.      Ning Montiel, [facility]  June 5, 2018                                                 Sivan Mathew     SAFETY PLAN:  Step 1: Warning signs / cues (Thoughts, images, mood, situation, behavior) that a crisis may be developing:    Thoughts: \"I don't matter\", \"People would be better off without me - get on without me\", \"I'm a burden\", \"I can't do this anymore\", \"I wish I was dead\", \"It would benefit everyone if I was dead\", \"I wish I could freeze and take a breath\", \"This is how it's always going to be\", \"You're going to be unhappy in the future - nothing is going to change\"    Images: obsessive thoughts of death or dying - thinking about bridges, how people would react, what would happen to finances and other responsibilities attached to me    Thinking Processes: ruminations (can't stop thinking about my problems), racing thoughts, intrusive thoughts (bothersome, unwanted thoughts that come out of nowhere), highly critical and negative thoughts    Mood: worsening depression and disinhibited (not caring about things or consequences)    Behaviors: isolating/withdrawing , using alcohol, impulsive, reckless behaviors (acting without thinking), not taking care of myself and not taking care of my responsibilities, can't talk to people, can't give direct eye contact, can't process situations     Situations: public shame: over sharing then feeling bad when others react, relationship problems, school stress   Step 2: Coping strategies - Things I can do to take my mind off of my problems without contacting another person (relaxation technique, physical activity):    Distress Tolerance Strategies:  listen to positive and upbeat music, sensory based activities/self-soothe with five senses, watch a funny youtube videos, pray, paced breathing/progressive muscle relaxation, intense exercise for 2-3 minutes, ride light rail      Physical Activities: go for a walk, " "exercise - running, deep breathing, weight lifting    Focus on helpful thoughts:  \"I will get through this\", \"It always passes\", \"Ride the wave\", think about an affirmation/compliment someone has said to me - \"you're my favorite\"  Step 3: People and social settings that provide distraction:   Name: Wendy (friend) Phone: [phone]   Name: Ashlee (roommate) Phone: [phone]   Name: Cesario (brother)  Phone: [phone]    Safe places - movie theater, park, Gnosticism, work   Step 4: Remind myself of people and things that are important to me and worth living for: family, close friends, coworkers, future relationship/family/dog, freedom and money to travel, helping global warming, having a job that matters  Step 5: When I am in crisis, I can ask these people to help me use my safety plan:   Name: Wendy (friend) Phone: [phone]   Name: Ashlee (roommate) Phone: [phone]   Name: Cesario (brother)  Phone: [phone]  Step 6: Making the environment safe:     remove alcohol  Step 7: Professionals or agencies I can contact during a crisis:    [facility] Daytime and After Hours Crisis Number: 518-438-5227    Suicide Prevention Lifeline: 0-832-998-TALK (8255)    Crisis Text Line Service (available 24 hours a day, 7 days a week): Text [place] to 982171  Local Crisis Services: [facility] -- [phone]    Call 911 or go to my nearest emergency department.   I helped develop this safety plan and agree to use it when needed.  I have been given a copy of this plan.      Client signature _________________________________________________________________  Today s date:  6/5/2018  Adapted from Safety Plan Template 2008 Lisette Dorman and Joe Mcnally is reprinted with the express permission of the authors.  No portion of the Safety Plan Template may be reproduced without the express, written permission.  You can contact the authors at [email or web address] or [email or web address].            "

## 2018-06-05 NOTE — MR AVS SNAPSHOT
MRN:[id number]                      After Visit Summary   6/5/2018    Sivan Mathew    MRN: [id number]           Visit Information        Provider Department      6/5/2018 3:30 PM Tiana Ning [facility] Generic      Your next 10 appointments already scheduled     Jun 19, 2018  5:30 PM CDT   Return Visit with Ning Montiel [facility] ([facility])    [facility]  [address]  [facility] [id number] [phone]            Jul 03, 2018  5:30 PM CDT   Return Visit with Ning Montiel [facility] ([facility])    [facility]  [address]  [address]  [facility] [id number] [phone]              MyChart Information     [employer] gives you secure access to your electronic health record. If you see a primary care provider, you can also send messages to your care team and make appointments. If you have questions, please call your primary care clinic.  If you do not have a primary care provider, please call [phone] and they will assist you.        Care EveryWhere ID     This is your Care EveryWhere ID. This could be used by other organizations to access your [place] medical records  [id number]        Equal Access to Services     LENNOX VILLALBA : Hadii silas granadoo Sosoraidaali, waaxda luqadaha, qaybta kaalmada adeegyada, edel arroyo. So [facility] [phone].    ATENCIÓN: Si habla español, tiene a gonzalez disposición servicios gratuitos de asistencia lingüística. Lledgard al [phone].    We comply with applicable federal civil rights laws and [place] laws. We do not discriminate on the basis of race, color, national origin, age, disability, sex, sexual orientation, or gender identity.

## 2018-06-06 ASSESSMENT — ANXIETY QUESTIONNAIRES: GAD7 TOTAL SCORE: 10

## 2018-06-06 ASSESSMENT — PATIENT HEALTH QUESTIONNAIRE - PHQ9: SUM OF ALL RESPONSES TO PHQ QUESTIONS 1-9: 14

## 2018-06-19 ENCOUNTER — OFFICE VISIT (OUTPATIENT)
Dept: PSYCHOLOGY | Facility: CLINIC | Age: 18
End: 2018-06-19
Payer: COMMERCIAL

## 2018-06-19 DIAGNOSIS — F33.1 MAJOR DEPRESSIVE DISORDER, RECURRENT, MODERATE (H): Primary | ICD-10-CM

## 2018-06-19 DIAGNOSIS — F41.1 GENERALIZED ANXIETY DISORDER: ICD-10-CM

## 2018-06-19 PROCEDURE — 90834 PSYTX W PT 45 MINUTES: CPT | Performed by: COUNSELOR

## 2018-06-19 ASSESSMENT — ANXIETY QUESTIONNAIRES
3. WORRYING TOO MUCH ABOUT DIFFERENT THINGS: SEVERAL DAYS
6. BECOMING EASILY ANNOYED OR IRRITABLE: SEVERAL DAYS
5. BEING SO RESTLESS THAT IT IS HARD TO SIT STILL: NOT AT ALL
7. FEELING AFRAID AS IF SOMETHING AWFUL MIGHT HAPPEN: SEVERAL DAYS
IF YOU CHECKED OFF ANY PROBLEMS ON THIS QUESTIONNAIRE, HOW DIFFICULT HAVE THESE PROBLEMS MADE IT FOR YOU TO DO YOUR WORK, TAKE CARE OF THINGS AT HOME, OR GET ALONG WITH OTHER PEOPLE: SOMEWHAT DIFFICULT
1. FEELING NERVOUS, ANXIOUS, OR ON EDGE: MORE THAN HALF THE DAYS
GAD7 TOTAL SCORE: 7
2. NOT BEING ABLE TO STOP OR CONTROL WORRYING: MORE THAN HALF THE DAYS

## 2018-06-19 ASSESSMENT — PATIENT HEALTH QUESTIONNAIRE - PHQ9: 5. POOR APPETITE OR OVEREATING: NOT AT ALL

## 2018-06-19 NOTE — MR AVS SNAPSHOT
MRN:[id number]                      After Visit Summary   6/19/2018    Sivan Mathew    MRN: [id number]           Visit Information        Provider Department      6/19/2018 5:30 PM Tiana Lisaidalia [facility] Generic      Your next 10 appointments already scheduled     Jul 03, 2018  5:30 PM CDT   Return Visit with Ning Tiana [facility] ([facility]  [address]  [facility] [id number] [phone]            Jul 26, 2018  5:30 PM CDT   Return Visit with Ning Montiel [facility] ([facility])    [facility]  [address]  [facility] [id number] [phone]            Aug 09, 2018  5:30 PM CDT   Return Visit with Ning Montiel [facility] ([facility]  [address]  [facility] [address] [phone]            Aug 20, 2018  5:30 PM CDT   Return Visit with Ning Dohertyong [facility] ([facility]  [address]  [facility] [id number] [phone]              MyChart Information     [employer] gives you secure access to your electronic health record. If you see a primary care provider, you can also send messages to your care team and make appointments. If you have questions, please call your primary care clinic.  If you do not have a primary care provider, please call [phone] and they will assist you.        Care EveryWhere ID     This is your Care EveryWhere ID. This could be used by other organizations to access your [place] medical records  [id number]        Equal Access to Services     LENNOX VILLALBA : Evelia Man, jada leonard, carter sapp, edel arroyo. So [facility] [phone].    ATENCIÓN: Si habla  español, tiene a gonzalez disposición servicios gratuitos de asistencia lingüística. Llame al [phone].    We comply with applicable federal civil rights laws and [place] laws. We do not discriminate on the basis of race, color, national origin, age, disability, sex, sexual orientation, or gender identity.

## 2018-06-19 NOTE — PROGRESS NOTES
"                                           Progress Note    Client Name: Sivan Mathew  Date: 6/19/2018         Service Type: Individual      Session Start Time: 5:35p  Session End Time: 6:15p      Session Length: 40 minutes     Session #: 4     Attendees: Client attended alone    Treatment Plan Last Reviewed: 6/19/2018  PHQ-9 / LEYLA-7 : 13 & 7       DATA      Progress Since Last Session (Related to Symptoms / Goals / Homework):   Symptoms: Worsened, but stable today, see Epic for PHQ 9 and LEYLA 7 updates     Homework: Client will share safety plan with friend - completed. By next appt, client will communicate with friend [place] about interpersonal conflict and assess friendship with Audra by attending to red flags.       Episode of Care Goals: Some progress - ACTION (Actively working towards change); Intervened by reinforcing change plan / affirming steps taken     Current / Ongoing Stressors and Concerns:   Reported interpersonal conflict in friend [place] and was able to confront friend although she felt she had to convince friend to apologize - described assertive, effective communication at times and at other times being dismissive of negative behaviors of friend, passive, and overly forgiving of friend; was able to identify a plan for addressing future conflict and to watch out for red flags vs being \"blindly optimistic\".      Treatment Objective(s) Addressed in This Session:    Client will learn 3 anxiety management strategies. Client will identify and practice 3 new strategies for dealing with strong emotions as they relate to SI. Client will feel less tired and more energy during the day.     Intervention:   CBT: identity and challenge self-defeating and maladaptive thoughts, self-talk, and behaviors, teach and reinforce proactive interpersonal communication and problem solving skills, identify emotions and potential underlying reasons/functions of emotions, teach assertiveness skills, reinforce " "self-awareness and proactive problem solving and help seeking behaviors; Motivational Interviewing: open-ended questions, affirmations, reflections, reinforce personal control and choices, challenge sustain talk, evoke change talk, reinforce natural coping skills, explore ambivalence and roadblocks to change; DBT: teach interpersonal effectiveness and dialectical thinking as it relates to relationships (self vs others)        ASSESSMENT: Current Emotional / Mental Status (status of significant symptoms):   Risk status (Self / Other harm or suicidal ideation)   Client reports the following current fears or concerns for personal safety: lives in off campus housing.  Client reports the following current or recent suicidal ideation or behaviors: onset - middle school, reports low self esteem, weight issues, and experienced bullying; states SI comes in waves and continued into college - no SI freshman year, but this year SI has worsened - hopelessness for the future, \"it's not worth it, what's the point, you haven't done anything, you won't do anything, accomplish anything\"; denies attempts, hospitalizations, plans; hardik by distracting, hanging out with friends, and getting \"really into something like watching a show\".   Client denies current or recent homicidal ideation or behaviors.  Client reports current or recent self injurious behavior or ideation including onset - high school, superficial cutting, no scars, last self-harm Sept 2017.  Client denies other safety concerns.  A safety and risk management plan has been developed at this time, see below  Client reports there are no firearms in the house.     Appearance:   Appropriate    Eye Contact:   Good    Psychomotor Behavior: Normal    Attitude:   Cooperative    Orientation:   All   Speech    Rate / Production: Normal     Volume:  Normal    Mood:    Depressed \"Tired\"   Affect:    Appropriate  Bright at times   Thought Content:  Clear  Rumination    Thought " Form:  Coherent  Logical  Circumstantial   Insight:    Fair     Medication Review:   No current psychiatric medications prescribed     Medication Compliance:   NA     Changes in Health Issues:   None reported     Chemical Use Review:   Substance Use: Chemical use reviewed, no active concerns identified      Tobacco Use: No current tobacco use     Collateral Reports Completed:   Not Applicable      PLAN: (Client Tasks / Therapist Tasks / Other)  Therapist will assign homework of using safety plan; role-play effective communication skills; teach emotional regulation skills.        Ning Montiel, [facility]                                                         ________________________________________________________________________    Treatment Plan    Client's Name: Sivan Mathew  YOB: 2000    Date: 6/5/2018    Diagnoses: 300.02 (F41.1) Generalized Anxiety Disorder & 296.32 (F33.1) Major Depressive Disorder, Recurrent Episode, Moderate; Hx Anorexia Nervosa  Psychosocial & Contextual Factors: School stress, relationship stress  WHODAS: 16    Referral / Collaboration:  Was/were discussed and client will pursue - see PCP for medication.    Anticipated number of session or this episode of care: 12      MeasurableTreatment Goal(s) related to diagnosis / functional impairment(s)  Goal 1: Client will improve mood as evidenced by decreased score on PHQ 9 from 10 (moderate) to 5 or less (mild).    I will know I've met my goal when I have fewer intrusive thoughts and more energy.      Objective #A (Client Action)    Client will identify and practice 3 new strategies for dealing with strong emotions as they relate to SI.  Status: New - Date: 6/5/2018     Intervention(s)  Therapist will assign homework of using safety plan; role-play effective communication skills; teach emotional regulation skills.    Objective #B  Client will feel less tired and more energy during the day.  Status: New - Date: 6/5/2018  "    Intervention(s)  Therapist will assign homework of social leisure planning and routine development; provide educational materials on what is self care; role-play assertiveness skills; teach emotional recognition/identification.     Goal 2: Client will better manage anxiety as evidenced by decreased score on LEYLA 7 from 5 to 0.     I will know I've met my goal when I have ewer anxious tendencies (talking loud, overly giggly and bright).      Objective #A (Client Action)    Client will learn 3 anxiety management strategies.  Status: New - Date: 6/5/2018     Intervention(s)  Therapist will assign homework of skill practice; provide educational materials on relaxation/mindfulness; role-play conflict management; teach the client how to perform a behavioral chain analysis.      Client has reviewed and agreed to the above plan.      Ning Montiel, [facility]  June 5, 2018                                                 Sivan Mathew     SAFETY PLAN:  Step 1: Warning signs / cues (Thoughts, images, mood, situation, behavior) that a crisis may be developing:    Thoughts: \"I don't matter\", \"People would be better off without me - get on without me\", \"I'm a burden\", \"I can't do this anymore\", \"I wish I was dead\", \"It would benefit everyone if I was dead\", \"I wish I could freeze and take a breath\", \"This is how it's always going to be\", \"You're going to be unhappy in the future - nothing is going to change\"    Images: obsessive thoughts of death or dying - thinking about bridges, how people would react, what would happen to finances and other responsibilities attached to me    Thinking Processes: ruminations (can't stop thinking about my problems), racing thoughts, intrusive thoughts (bothersome, unwanted thoughts that come out of nowhere), highly critical and negative thoughts    Mood: worsening depression and disinhibited (not caring about things or consequences)    Behaviors: isolating/withdrawing , using alcohol, impulsive, " "reckless behaviors (acting without thinking), not taking care of myself and not taking care of my responsibilities, can't talk to people, can't give direct eye contact, can't process situations     Situations: public shame: over sharing then feeling bad when others react, relationship problems, school stress   Step 2: Coping strategies - Things I can do to take my mind off of my problems without contacting another person (relaxation technique, physical activity):    Distress Tolerance Strategies:  listen to positive and upbeat music, sensory based activities/self-soothe with five senses, watch a funny youtPrivacyProtector videos, pray, paced breathing/progressive muscle relaxation, intense exercise for 2-3 minutes, ride light rail      Physical Activities: go for a walk, exercise - running, deep breathing, weight lifting    Focus on helpful thoughts:  \"I will get through this\", \"It always passes\", \"Ride the wave\", think about an affirmation/compliment someone has said to me - \"you're my favorite\"  Step 3: People and social settings that provide distraction:   Name: Wendy (friend) Phone: [phone]   Name: Ashlee (roommate) Phone: [phone]   Name: Cesario (brother)  Phone: [phone]    Safe places - movie theater, park, Buddhist, work   Step 4: Remind myself of people and things that are important to me and worth living for: family, close friends, coworkers, future relationship/family/dog, freedom and money to travel, helping global warming, having a job that matters  Step 5: When I am in crisis, I can ask these people to help me use my safety plan:   Name: Wendy (friend) Phone: [phone]   Name: Ashlee (roommate) Phone: [phone]   Name: Cesario (brother)  Phone: [phone]  Step 6: Making the environment safe:     remove alcohol  Step 7: Professionals or agencies I can contact during a crisis:    [facility] Daytime and After Hours Crisis Number: [phone]    Suicide Prevention Lifeline: " 7-862-946-TALK (6090)    Crisis Text Line Service (available 24 hours a day, 7 days a week): Text [place] to 474327  Local Crisis Services: [facility] -- [phone]    Call 911 or go to my nearest emergency department.   I helped develop this safety plan and agree to use it when needed.  I have been given a copy of this plan.      Client signature _________________________________________________________________  Today s date:  6/5/2018  Adapted from Safety Plan Template 2008 Lisette Dorman and Joe Mcnally is reprinted with the express permission of the authors.  No portion of the Safety Plan Template may be reproduced without the express, written permission.  You can contact the authors at [email or web address] or [email or web address].

## 2018-06-20 ASSESSMENT — ANXIETY QUESTIONNAIRES: GAD7 TOTAL SCORE: 7

## 2018-06-20 ASSESSMENT — PATIENT HEALTH QUESTIONNAIRE - PHQ9: SUM OF ALL RESPONSES TO PHQ QUESTIONS 1-9: 13

## 2018-07-03 ENCOUNTER — OFFICE VISIT (OUTPATIENT)
Dept: PSYCHOLOGY | Facility: CLINIC | Age: 18
End: 2018-07-03
Payer: COMMERCIAL

## 2018-07-03 DIAGNOSIS — F41.1 GENERALIZED ANXIETY DISORDER: ICD-10-CM

## 2018-07-03 DIAGNOSIS — F33.1 MAJOR DEPRESSIVE DISORDER, RECURRENT, MODERATE (H): Primary | ICD-10-CM

## 2018-07-03 PROCEDURE — 90834 PSYTX W PT 45 MINUTES: CPT | Performed by: COUNSELOR

## 2018-07-03 ASSESSMENT — ANXIETY QUESTIONNAIRES
7. FEELING AFRAID AS IF SOMETHING AWFUL MIGHT HAPPEN: SEVERAL DAYS
3. WORRYING TOO MUCH ABOUT DIFFERENT THINGS: MORE THAN HALF THE DAYS
1. FEELING NERVOUS, ANXIOUS, OR ON EDGE: SEVERAL DAYS
6. BECOMING EASILY ANNOYED OR IRRITABLE: SEVERAL DAYS
IF YOU CHECKED OFF ANY PROBLEMS ON THIS QUESTIONNAIRE, HOW DIFFICULT HAVE THESE PROBLEMS MADE IT FOR YOU TO DO YOUR WORK, TAKE CARE OF THINGS AT HOME, OR GET ALONG WITH OTHER PEOPLE: SOMEWHAT DIFFICULT
GAD7 TOTAL SCORE: 8
2. NOT BEING ABLE TO STOP OR CONTROL WORRYING: MORE THAN HALF THE DAYS
5. BEING SO RESTLESS THAT IT IS HARD TO SIT STILL: SEVERAL DAYS

## 2018-07-03 ASSESSMENT — PATIENT HEALTH QUESTIONNAIRE - PHQ9: 5. POOR APPETITE OR OVEREATING: NOT AT ALL

## 2018-07-03 NOTE — PROGRESS NOTES
Progress Note    Client Name: Sivan Mathew  Date: 7/3/2018         Service Type: Individual      Session Start Time: 5:35p  Session End Time: 6:15p      Session Length: 40 minutes     Session #: 5     Attendees: Client attended alone    Treatment Plan Last Reviewed: 7/3/2018  PHQ-9 / LEYLA-7 : 15 & 7       DATA      Progress Since Last Session (Related to Symptoms / Goals / Homework):   Symptoms: Stable, see Epic for PHQ 9 and LEYLA 7 updates     Homework: Completed - client will communicate with friend [place] about interpersonal conflict and assess friendship with Audra by attending to red flags. Client will also work to practice wise mind and gratitude as well as challenge black and white thinking.      Episode of Care Goals: Some progress - ACTION (Actively working towards change); Intervened by reinforcing change plan / affirming steps taken     Current / Ongoing Stressors and Concerns:   Reported ongoing interpersonal conflict in friend [place] and receiving feedback from friends how she emotionally drains them and contributes to some problems; reported feeling surprised by feedback, but also able to take accountability for her behaviors and expressed desire and plans to change; reported feeling proud of herself for not having SI in response to negative feedback from friends; reported working to be more self-aware in social interactions.     Treatment Objective(s) Addressed in This Session:    Client will learn 3 anxiety management strategies. Client will identify and practice 3 new strategies for dealing with strong emotions as they relate to SI. Client will feel less tired and more energy during the day.     Intervention:   CBT: identity and challenge self-defeating and maladaptive thoughts, self-talk, and behaviors, teach and reinforce proactive interpersonal communication and problem solving skills, identify emotions and potential underlying reasons/functions of  "emotions, teach assertiveness skills, reinforce self-awareness and proactive problem solving and help seeking behaviors; Motivational Interviewing: open-ended questions, affirmations, reflections, reinforce personal control and choices, challenge sustain talk, evoke change talk, reinforce natural coping skills, explore ambivalence and roadblocks to change; DBT: teach interpersonal effectiveness and dialectical thinking as it relates to relationships (self vs others), introduced wise mind and dialectical thinking to address black and white thinking         ASSESSMENT: Current Emotional / Mental Status (status of significant symptoms):   Risk status (Self / Other harm or suicidal ideation)   Client reports the following current fears or concerns for personal safety: lives in off campus housing.  Client reports the following current or recent suicidal ideation or behaviors: onset - middle school, reports low self esteem, weight issues, and experienced bullying; states SI comes in waves and continued into college - no SI freshman year, but this year SI has worsened - hopelessness for the future, \"it's not worth it, what's the point, you haven't done anything, you won't do anything, accomplish anything\"; denies attempts, hospitalizations, plans; hardik by distracting, hanging out with friends, and getting \"really into something like watching a show\".   Client denies current or recent homicidal ideation or behaviors.  Client reports current or recent self injurious behavior or ideation including onset - high school, superficial cutting, no scars, last self-harm Sept 2017.  Client denies other safety concerns.  A safety and risk management plan has been developed at this time, see below  Client reports there are no firearms in the house.     Appearance:   Appropriate    Eye Contact:   Good    Psychomotor Behavior: Normal    Attitude:   Cooperative    Orientation:   All   Speech    Rate / Production: Normal " "    Volume:  Normal    Mood:    Some depression some feeling \"good\"   Affect:    Appropriate  Bright at times   Thought Content:  Clear  Lessrumination    Thought Form:  Coherent  Logical  Circumstantial   Insight:    Good     Medication Review:   No current psychiatric medications prescribed     Medication Compliance:   NA     Changes in Health Issues:   None reported     Chemical Use Review:   Substance Use: Chemical use reviewed, no active concerns identified      Tobacco Use: No current tobacco use     Collateral Reports Completed:   Not Applicable      PLAN: (Client Tasks / Therapist Tasks / Other)  Therapist will assign homework of using safety plan; role-play effective communication skills; teach emotional regulation skills. Continue to reinforce interpersonal effectiveness and self-awareness.        Lisaidalia Montiel [facility]                                                         ________________________________________________________________________    Treatment Plan    Client's Name: Sivan Mathew  YOB: 2000    Date: 6/5/2018    Diagnoses: 300.02 (F41.1) Generalized Anxiety Disorder & 296.32 (F33.1) Major Depressive Disorder, Recurrent Episode, Moderate; Hx Anorexia Nervosa  Psychosocial & Contextual Factors: School stress, relationship stress  WHODAS: 16    Referral / Collaboration:  Was/were discussed and client will pursue - see PCP for medication.    Anticipated number of session or this episode of care: 12      MeasurableTreatment Goal(s) related to diagnosis / functional impairment(s)  Goal 1: Client will improve mood as evidenced by decreased score on PHQ 9 from 10 (moderate) to 5 or less (mild).    I will know I've met my goal when I have fewer intrusive thoughts and more energy.      Objective #A (Client Action)    Client will identify and practice 3 new strategies for dealing with strong emotions as they relate to SI.  Status: New - Date: 6/5/2018     Intervention(s)  Therapist will " "assign homework of using safety plan; role-play effective communication skills; teach emotional regulation skills.    Objective #B  Client will feel less tired and more energy during the day.  Status: New - Date: 6/5/2018     Intervention(s)  Therapist will assign homework of social leisure planning and routine development; provide educational materials on what is self care; role-play assertiveness skills; teach emotional recognition/identification.     Goal 2: Client will better manage anxiety as evidenced by decreased score on LEYLA 7 from 5 to 0.     I will know I've met my goal when I have ewer anxious tendencies (talking loud, overly giggly and bright).      Objective #A (Client Action)    Client will learn 3 anxiety management strategies.  Status: New - Date: 6/5/2018     Intervention(s)  Therapist will assign homework of skill practice; provide educational materials on relaxation/mindfulness; role-play conflict management; teach the client how to perform a behavioral chain analysis.      Client has reviewed and agreed to the above plan.      Ning Montiel, [facility]  June 5, 2018                                                 Sivan Mathew     SAFETY PLAN:  Step 1: Warning signs / cues (Thoughts, images, mood, situation, behavior) that a crisis may be developing:    Thoughts: \"I don't matter\", \"People would be better off without me - get on without me\", \"I'm a burden\", \"I can't do this anymore\", \"I wish I was dead\", \"It would benefit everyone if I was dead\", \"I wish I could freeze and take a breath\", \"This is how it's always going to be\", \"You're going to be unhappy in the future - nothing is going to change\"    Images: obsessive thoughts of death or dying - thinking about bridges, how people would react, what would happen to finances and other responsibilities attached to me    Thinking Processes: ruminations (can't stop thinking about my problems), racing thoughts, intrusive thoughts (bothersome, unwanted " "thoughts that come out of nowhere), highly critical and negative thoughts    Mood: worsening depression and disinhibited (not caring about things or consequences)    Behaviors: isolating/withdrawing , using alcohol, impulsive, reckless behaviors (acting without thinking), not taking care of myself and not taking care of my responsibilities, can't talk to people, can't give direct eye contact, can't process situations     Situations: public shame: over sharing then feeling bad when others react, relationship problems, school stress   Step 2: Coping strategies - Things I can do to take my mind off of my problems without contacting another person (relaxation technique, physical activity):    Distress Tolerance Strategies:  listen to positive and upbeat music, sensory based activities/self-soothe with five senses, watch a funny youtube videos, pray, paced breathing/progressive muscle relaxation, intense exercise for 2-3 minutes, ride light rail      Physical Activities: go for a walk, exercise - running, deep breathing, weight lifting    Focus on helpful thoughts:  \"I will get through this\", \"It always passes\", \"Ride the wave\", think about an affirmation/compliment someone has said to me - \"you're my favorite\"  Step 3: People and social settings that provide distraction:   Name: Wendy (friend) Phone: [phone]   Name: Ashlee (roommate) Phone: [phone]   Name: Cesario (brother)  Phone: [phone]    Safe places - movie theater, park, Taoist, work   Step 4: Remind myself of people and things that are important to me and worth living for: family, close friends, coworkers, future relationship/family/dog, freedom and money to travel, helping global warming, having a job that matters  Step 5: When I am in crisis, I can ask these people to help me use my safety plan:   Name: Wendy (friend) Phone: [phone]   Name: Ashlee (roommate) Phone: [phone]   Name: Cesario (brother)  Phone: [phone]  Step 6: Making the " environment safe:     remove alcohol  Step 7: Professionals or agencies I can contact during a crisis:    [facility] Daytime and After Hours Crisis Number: 704-630-5974    Suicide Prevention Lifeline: 0-251-708-XQDU (5998)    Crisis Text Line Service (available 24 hours a day, 7 days a week): Text [place] to 492339  Local Crisis Services: [facility]- [phone]    Call 911 or go to my nearest emergency department.   I helped develop this safety plan and agree to use it when needed.  I have been given a copy of this plan.      Client signature _________________________________________________________________  Today s date:  6/5/2018  Adapted from Safety Plan Template 2008 Lisette Dorman and Joe Mcnally is reprinted with the express permission of the authors.  No portion of the Safety Plan Template may be reproduced without the express, written permission.  You can contact the authors at [email or web address] or [email or web address].

## 2018-07-03 NOTE — MR AVS SNAPSHOT
MRN:[id number]                      After Visit Summary   7/3/2018    Sivan Mathew    MRN: [id number]           Visit Information        Provider Department      7/3/2018 5:30 PM Tiana Ning [facility] Generic      Your next 10 appointments already scheduled     Jul 26, 2018  5:30 PM CDT   Return Visit with Lisaidalia Montiel [facility] ([facility])    [facility]  [address] [facility] [address]  [facility] [id number] [phone]            Aug 09, 2018  5:30 PM CDT   Return Visit with Lisaidalia Montiel [facility] ([facility])    [facility]  [address]  [facility] [id number] [phone]            Aug 20, 2018  5:30 PM CDT   Return Visit with Lisaidalia Montiel [facility] ([facility]  [address] [place], [place] [id number]  [facility] [id number] [phone]              MyChart Information     [facility] gives you secure access to your electronic health record. If you see a primary care provider, you can also send messages to your care team and make appointments. If you have questions, please call your primary care clinic.  If you do not have a primary care provider, please call [phone] and they will assist you.        Care EveryWhere ID     This is your Care EveryWhere ID. This could be used by other organizations to access your [facility] medical records  [id number]        Equal Access to Services     LENNOX VILLALBA AH: Hadii aad ku hadasho Soomaali, waaxda luqadaha, qaybta kaalmada adeegyada, waxaklpesh posadasin hayaan shaynealan boyle . So [facility] [phone].    ATENCIÓN: Si habla español, tiene a gonzalez disposición servicios gratuitos de asistencia lingüística. Llame al [phone].    We comply with applicable federal civil rights laws and [place] laws. We do not discriminate on the basis of race,  color, national origin, age, disability, sex, sexual orientation, or gender identity.

## 2018-07-04 ASSESSMENT — ANXIETY QUESTIONNAIRES: GAD7 TOTAL SCORE: 8

## 2018-07-04 ASSESSMENT — PATIENT HEALTH QUESTIONNAIRE - PHQ9: SUM OF ALL RESPONSES TO PHQ QUESTIONS 1-9: 15

## 2018-07-26 ENCOUNTER — OFFICE VISIT (OUTPATIENT)
Dept: PSYCHOLOGY | Facility: CLINIC | Age: 18
End: 2018-07-26
Payer: COMMERCIAL

## 2018-07-26 DIAGNOSIS — F33.1 MAJOR DEPRESSIVE DISORDER, RECURRENT, MODERATE (H): ICD-10-CM

## 2018-07-26 DIAGNOSIS — F41.1 GENERALIZED ANXIETY DISORDER: Primary | ICD-10-CM

## 2018-07-26 PROCEDURE — 90834 PSYTX W PT 45 MINUTES: CPT | Performed by: COUNSELOR

## 2018-07-26 ASSESSMENT — PATIENT HEALTH QUESTIONNAIRE - PHQ9: 5. POOR APPETITE OR OVEREATING: SEVERAL DAYS

## 2018-07-26 ASSESSMENT — ANXIETY QUESTIONNAIRES
IF YOU CHECKED OFF ANY PROBLEMS ON THIS QUESTIONNAIRE, HOW DIFFICULT HAVE THESE PROBLEMS MADE IT FOR YOU TO DO YOUR WORK, TAKE CARE OF THINGS AT HOME, OR GET ALONG WITH OTHER PEOPLE: SOMEWHAT DIFFICULT
2. NOT BEING ABLE TO STOP OR CONTROL WORRYING: SEVERAL DAYS
7. FEELING AFRAID AS IF SOMETHING AWFUL MIGHT HAPPEN: MORE THAN HALF THE DAYS
GAD7 TOTAL SCORE: 9
5. BEING SO RESTLESS THAT IT IS HARD TO SIT STILL: SEVERAL DAYS
3. WORRYING TOO MUCH ABOUT DIFFERENT THINGS: MORE THAN HALF THE DAYS
6. BECOMING EASILY ANNOYED OR IRRITABLE: SEVERAL DAYS
1. FEELING NERVOUS, ANXIOUS, OR ON EDGE: SEVERAL DAYS

## 2018-07-26 NOTE — MR AVS SNAPSHOT
MRN:[id number]                      After Visit Summary   7/26/2018    Sivan Mathew    MRN: [id number]           Visit Information        Provider Department      7/26/2018 5:30 PM TianaNing conti [facility] Generic      Your next 10 appointments already scheduled     Aug 09, 2018  5:30 PM CDT   Return Visit with Ning Montiel [facility] ([facility])    [facility]  [address] [facility]  [facility] [id number] [phone]            Aug 20, 2018  5:30 PM CDT   Return Visit with Ning Montiel [facility] ([facility])    [facility]  [address]  [address]  [facility] [id number] [phone]              MyChart Information     [email or web address] gives you secure access to your electronic health record. If you see a primary care provider, you can also send messages to your care team and make appointments. If you have questions, please call your primary care clinic.  If you do not have a primary care provider, please call [phone] and they will assist you.        Care EveryWhere ID     This is your Care EveryWhere ID. This could be used by other organizations to access your [place] medical records  [id number]        Equal Access to Services     LENNOX VILLALBA : Hadii silas granadoo Sosoraidaali, waaxda luqadaha, qaybta kaalmada adeegyada, edel arroyo. So [facility] [phone].    ATENCIÓN: Si habla español, tiene a gonzalez disposición servicios gratuitos de asistencia lingüística. Lledgard al [phone].    We comply with applicable federal civil rights laws and [place] laws. We do not discriminate on the basis of race, color, national origin, age, disability, sex, sexual orientation, or gender identity.

## 2018-07-26 NOTE — PROGRESS NOTES
Progress Note    Client Name: Sivan Mathew  Date: 7/26/2018         Service Type: Individual      Session Start Time: 5:35p  Session End Time: 6:15p      Session Length: 40 minutes     Session #: 6     Attendees: Client attended alone    Treatment Plan Last Reviewed: 7/26/2018  PHQ-9 / LEYLA-7 : 16 & 9       DATA      Progress Since Last Session (Related to Symptoms / Goals / Homework):   Symptoms: Stable, see Epic for PHQ 9 and LEYLA 7 updates     Homework: Completed - client will also work to practice wise mind and gratitude as well as challenge black and white thinking. By next appt, client will work on active listening, looking at things as opportunities, and gathering information about career.      Episode of Care Goals: Some progress - ACTION (Actively working towards change); Intervened by reinforcing change plan / affirming steps taken     Current / Ongoing Stressors and Concerns:   Reported improved interpersonal conflict in friend [place] due to increased self-awareness and learning active listening; ongoing anxiety about school and career planning - reported receiving discouraging feedback from a coworker and mother about their own career decisions and feeling fearful that will graduate college with no plan or end up in a job where she is unhappy; was able to reframe, take perspective, and problem solve learning more about herself and career interests.      Treatment Objective(s) Addressed in This Session:    Client will learn 3 anxiety management strategies. Client will identify and practice 3 new strategies for dealing with strong emotions as they relate to SI. Client will feel less tired and more energy during the day.     Intervention:   CBT: identity and challenge self-defeating and maladaptive thoughts, self-talk, and behaviors, teach and reinforce proactive interpersonal communication and problem solving skills, identify emotions and potential underlying  "reasons/functions of emotions, teach assertiveness skills, reinforce self-awareness and proactive problem solving and help seeking behaviors; Motivational Interviewing: open-ended questions, affirmations, reflections, reinforce personal control and choices, challenge sustain talk, evoke change talk, reinforce natural coping skills, explore ambivalence and roadblocks to change; DBT: teach interpersonal effectiveness and dialectical thinking as it relates to relationships (self vs others), reinforce wise mind and dialectical thinking to address black and white thinking, teach active listening for effective communication          ASSESSMENT: Current Emotional / Mental Status (status of significant symptoms):   Risk status (Self / Other harm or suicidal ideation)   Client reports the following current fears or concerns for personal safety: lives in off campus housing.  Client reports the following current or recent suicidal ideation or behaviors: onset - middle school, reports low self esteem, weight issues, and experienced bullying; states SI comes in waves and continued into college - no SI freshman year, but this year SI has worsened - hopelessness for the future, \"it's not worth it, what's the point, you haven't done anything, you won't do anything, accomplish anything\"; denies attempts, hospitalizations, plans; hardik by distracting, hanging out with friends, and getting \"really into something like watching a show\".   Client denies current or recent homicidal ideation or behaviors.  Client reports current or recent self injurious behavior or ideation including onset - high school, superficial cutting, no scars, last self-harm Sept 2017.  Client denies other safety concerns.  A safety and risk management plan has been developed at this time, see below  Client reports there are no firearms in the house.     Appearance:   Appropriate    Eye Contact:   Good    Psychomotor Behavior: Normal    Attitude:   Cooperative " "   Orientation:   All   Speech    Rate / Production: Normal     Volume:  Normal    Mood:    \"Good\"   Affect:    Appropriate  Bright    Thought Content:  Clear  Less rumination    Thought Form:  Coherent  Logical  Circumstantial   Insight:    Good     Medication Review:   No current psychiatric medications prescribed     Medication Compliance:   NA     Changes in Health Issues:   None reported     Chemical Use Review:   Substance Use: Chemical use reviewed, no active concerns identified      Tobacco Use: No current tobacco use     Collateral Reports Completed:   Not Applicable      PLAN: (Client Tasks / Therapist Tasks / Other)  Therapist will assign homework of using safety plan; role-play effective communication skills; teach emotional regulation skills. Continue to reinforce interpersonal effectiveness and self-awareness. Continue to reinforce active listening, self-care, and help seeking behaviors.         Ning Montiel [facility]                                                         ________________________________________________________________________    Treatment Plan    Client's Name: Sivan Mathew  YOB: 2000    Date: 6/5/2018    Diagnoses: 300.02 (F41.1) Generalized Anxiety Disorder & 296.32 (F33.1) Major Depressive Disorder, Recurrent Episode, Moderate; Hx Anorexia Nervosa  Psychosocial & Contextual Factors: School stress, relationship stress  WHODAS: 16    Referral / Collaboration:  Was/were discussed and client will pursue - see PCP for medication.    Anticipated number of session or this episode of care: 12      MeasurableTreatment Goal(s) related to diagnosis / functional impairment(s)  Goal 1: Client will improve mood as evidenced by decreased score on PHQ 9 from 10 (moderate) to 5 or less (mild).    I will know I've met my goal when I have fewer intrusive thoughts and more energy.      Objective #A (Client Action)    Client will identify and practice 3 new strategies for dealing with " "strong emotions as they relate to SI.  Status: New - Date: 6/5/2018     Intervention(s)  Therapist will assign homework of using safety plan; role-play effective communication skills; teach emotional regulation skills.    Objective #B  Client will feel less tired and more energy during the day.  Status: New - Date: 6/5/2018     Intervention(s)  Therapist will assign homework of social leisure planning and routine development; provide educational materials on what is self care; role-play assertiveness skills; teach emotional recognition/identification.     Goal 2: Client will better manage anxiety as evidenced by decreased score on LEYLA 7 from 5 to 0.     I will know I've met my goal when I have ewer anxious tendencies (talking loud, overly giggly and bright).      Objective #A (Client Action)    Client will learn 3 anxiety management strategies.  Status: New - Date: 6/5/2018     Intervention(s)  Therapist will assign homework of skill practice; provide educational materials on relaxation/mindfulness; role-play conflict management; teach the client how to perform a behavioral chain analysis.      Client has reviewed and agreed to the above plan.      Ning Montiel, [facility]  June 5, 2018                                                 Sivan Mathew     SAFETY PLAN:  Step 1: Warning signs / cues (Thoughts, images, mood, situation, behavior) that a crisis may be developing:    Thoughts: \"I don't matter\", \"People would be better off without me - get on without me\", \"I'm a burden\", \"I can't do this anymore\", \"I wish I was dead\", \"It would benefit everyone if I was dead\", \"I wish I could freeze and take a breath\", \"This is how it's always going to be\", \"You're going to be unhappy in the future - nothing is going to change\"    Images: obsessive thoughts of death or dying - thinking about bridges, how people would react, what would happen to finances and other responsibilities attached to me    Thinking Processes: ruminations " "(can't stop thinking about my problems), racing thoughts, intrusive thoughts (bothersome, unwanted thoughts that come out of nowhere), highly critical and negative thoughts    Mood: worsening depression and disinhibited (not caring about things or consequences)    Behaviors: isolating/withdrawing , using alcohol, impulsive, reckless behaviors (acting without thinking), not taking care of myself and not taking care of my responsibilities, can't talk to people, can't give direct eye contact, can't process situations     Situations: public shame: over sharing then feeling bad when others react, relationship problems, school stress   Step 2: Coping strategies - Things I can do to take my mind off of my problems without contacting another person (relaxation technique, physical activity):    Distress Tolerance Strategies:  listen to positive and upbeat music, sensory based activities/self-soothe with five senses, watch a [employer] videos, pray, paced breathing/progressive muscle relaxation, intense exercise for 2-3 minutes, ride light rail      Physical Activities: go for a walk, exercise - running, deep breathing, weight lifting    Focus on helpful thoughts:  \"I will get through this\", \"It always passes\", \"Ride the wave\", think about an affirmation/compliment someone has said to me - \"you're my favorite\"  Step 3: People and social settings that provide distraction:   Name: Wendy (friend) Phone: [phone]   Name: Ashlee (roommate) Phone: [phone]   Name: Cesario (brother)  Phone: [phone]    Safe places - movie theater, park, Rastafari, work   Step 4: Remind myself of people and things that are important to me and worth living for: family, close friends, coworkers, future relationship/family/dog, freedom and money to travel, helping global warming, having a job that matters  Step 5: When I am in crisis, I can ask these people to help me use my safety plan:   Name: Wendy (friend) Phone: [phone]   Name: " Ashlee (roommate) Phone: [phone]   Name: Cesario (brother)  Phone: [phone]  Step 6: Making the environment safe:     remove alcohol  Step 7: Professionals or agencies I can contact during a crisis:    [facility] Daytime and After Hours Crisis Number: 400-585-4731    Suicide Prevention Lifeline: 2-915-848-TALK (0535)    Crisis Text Line Service (available 24 hours a day, 7 days a week): Text [place] to 152033  Local Crisis Services: [facility] -- [phone]    Call 911 or go to my nearest emergency department.   I helped develop this safety plan and agree to use it when needed.  I have been given a copy of this plan.      Client signature _________________________________________________________________  Today s date:  6/5/2018  Adapted from Safety Plan Template 2008 Lisette Dorman and Joe Mcnally is reprinted with the express permission of the authors.  No portion of the Safety Plan Template may be reproduced without the express, written permission.  You can contact the authors at [email or web address] or [email or web address].

## 2018-07-27 ASSESSMENT — ANXIETY QUESTIONNAIRES: GAD7 TOTAL SCORE: 9

## 2018-07-27 ASSESSMENT — PATIENT HEALTH QUESTIONNAIRE - PHQ9: SUM OF ALL RESPONSES TO PHQ QUESTIONS 1-9: 16

## 2018-08-09 ENCOUNTER — OFFICE VISIT (OUTPATIENT)
Dept: PSYCHOLOGY | Facility: CLINIC | Age: 18
End: 2018-08-09
Payer: COMMERCIAL

## 2018-08-09 DIAGNOSIS — F41.1 GENERALIZED ANXIETY DISORDER: Primary | ICD-10-CM

## 2018-08-09 DIAGNOSIS — F33.1 MAJOR DEPRESSIVE DISORDER, RECURRENT, MODERATE (H): ICD-10-CM

## 2018-08-09 PROCEDURE — 90834 PSYTX W PT 45 MINUTES: CPT | Performed by: COUNSELOR

## 2018-08-09 NOTE — MR AVS SNAPSHOT
MRN:[id number]                      After Visit Summary   8/9/2018    Sivan Mathew    MRN: [id number]           Visit Information        Provider Department      8/9/2018 5:30 PM Tiana Ning [facility] Generic      Your next 10 appointments already scheduled     Aug 20, 2018  5:30 PM CDT   Return Visit with Ning Montiel [facility] ([facility])    [facility]  [address] [facility]  [place] [id number] [phone]            Sep 06, 2018  3:30 PM CDT   Return Visit with Ning Montiel [facility] ([facility])    [facility]  [address]  [address]  [place] [facility] [id number] [phone]              MyChart Information     [employer] gives you secure access to your electronic health record. If you see a primary care provider, you can also send messages to your care team and make appointments. If you have questions, please call your primary care clinic.  If you do not have a primary care provider, please call [phone] and they will assist you.        Care EveryWhere ID     This is your Care EveryWhere ID. This could be used by other organizations to access your [place] medical records  [id number]        Equal Access to Services     LENNOX VILLALBA : Hadii silas granadoo Sosoraidaali, waaxda luqadaha, qaybta kaalmada adeegyada, edel arroyo. So [facility] [phone].    ATENCIÓN: Si habla español, tiene a gonzalez disposición servicios gratuitos de asistencia lingüística. [place] al [phone].    We comply with applicable federal civil rights laws and [place] laws. We do not discriminate on the basis of race, color, national origin, age, disability, sex, sexual orientation, or gender identity.

## 2018-08-09 NOTE — PROGRESS NOTES
Progress Note    Client Name: Sivan Mathew  Date: 8/9/2018         Service Type: Individual      Session Start Time: 5:35p  Session End Time: 6:15p      Session Length: 40 minutes     Session #: 7     Attendees: Client attended alone    Treatment Plan Last Reviewed: 8/9/2018  PHQ-9 / LEYLA-7 : 16 & 9       DATA      Progress Since Last Session (Related to Symptoms / Goals / Homework):   Symptoms: Somewhat worsened, see Epic for PHQ 9 and LEYLA 7 updates     Homework: Completed and continued - client will work on active listening, looking at things as opportunities, and gathering information about career. By next appt, client identify positive affirmations/cheerleading statements, schedule PCP appt for medications, and continue to work on active listening.      Episode of Care Goals: Some progress - ACTION (Actively working towards change); Intervened by reinforcing change plan / affirming steps taken     Current / Ongoing Stressors and Concerns:   Reported worsened anxiety - described over thinking interpersonal interactions and therefore not listening or able to be authentic; also noticed negative self-talk and self-image and therefore misperceiving how others see her; was responsive to coaching and educational material on active listening and behavior chain analysis to identify problematic behavior and thought patterns.     Treatment Objective(s) Addressed in This Session:    Client will learn 3 anxiety management strategies. Client will identify and practice 3 new strategies for dealing with strong emotions as they relate to SI. Client will feel less tired and more energy during the day.     Intervention:   CBT: identity and challenge self-defeating and maladaptive thoughts, self-talk, and behaviors, teach and reinforce proactive interpersonal communication and problem solving skills, identify emotions and potential underlying reasons/functions of emotions, teach  "assertiveness skills, reinforce self-awareness and proactive problem solving and help seeking behaviors, teach identifying self-affirmations; Motivational Interviewing: open-ended questions, affirmations, reflections, reinforce personal control and choices, challenge sustain talk, evoke change talk, reinforce natural coping skills, explore ambivalence and roadblocks to change; DBT: teach interpersonal effectiveness and dialectical thinking as it relates to relationships (self vs others), reinforce wise mind and dialectical thinking to address black and white thinking, teach active listening for effective communication          ASSESSMENT: Current Emotional / Mental Status (status of significant symptoms):   Risk status (Self / Other harm or suicidal ideation)   Client reports the following current fears or concerns for personal safety: lives in off campus housing.  Client reports the following current or recent suicidal ideation or behaviors: onset - middle school, reports low self esteem, weight issues, and experienced bullying; states SI comes in waves and continued into college - no SI freshman year, but this year SI has worsened - hopelessness for the future, \"it's not worth it, what's the point, you haven't done anything, you won't do anything, accomplish anything\"; denies attempts, hospitalizations, plans; hardik by distracting, hanging out with friends, and getting \"really into something like watching a show\".   Client denies current or recent homicidal ideation or behaviors.  Client reports current or recent self injurious behavior or ideation including onset - high school, superficial cutting, no scars, last self-harm Sept 2017.  Client denies other safety concerns.  A safety and risk management plan has been developed at this time, see below  Client reports there are no firearms in the house.     Appearance:   Appropriate    Eye Contact:   Good    Psychomotor Behavior: Normal    Attitude:   Cooperative "    Orientation:   All   Speech    Rate / Production: Normal     Volume:  Normal    Mood:    Anxious   Affect:    Appropriate     Thought Content:  Rumination    Thought Form:  Coherent  Logical  Circumstantial   Insight:    Good     Medication Review:   No current psychiatric medications prescribed     Medication Compliance:   NA     Changes in Health Issues:   None reported     Chemical Use Review:   Substance Use: Chemical use reviewed, no active concerns identified      Tobacco Use: No current tobacco use     Collateral Reports Completed:   Not Applicable      PLAN: (Client Tasks / Therapist Tasks / Other)  Therapist will assign homework of using safety plan; role-play effective communication skills; teach emotional regulation skills. Continue to reinforce interpersonal effectiveness and self-awareness. Continue to reinforce active listening, self-care, and help seeking behaviors. Continue to reinforce self-care and effective communication to reduce negative self-image.         Ning Montiel [facility]                                                         ________________________________________________________________________    Treatment Plan    Client's Name: Sivan Mathew  YOB: 2000    Date: 6/5/2018    Diagnoses: 300.02 (F41.1) Generalized Anxiety Disorder & 296.32 (F33.1) Major Depressive Disorder, Recurrent Episode, Moderate; Hx Anorexia Nervosa  Psychosocial & Contextual Factors: School stress, relationship stress  WHODAS: 16    Referral / Collaboration:  Was/were discussed and client will pursue - see PCP for medication.    Anticipated number of session or this episode of care: 12      MeasurableTreatment Goal(s) related to diagnosis / functional impairment(s)  Goal 1: Client will improve mood as evidenced by decreased score on PHQ 9 from 10 (moderate) to 5 or less (mild).    I will know I've met my goal when I have fewer intrusive thoughts and more energy.      Objective #A (Client  "Action)    Client will identify and practice 3 new strategies for dealing with strong emotions as they relate to SI.  Status: New - Date: 6/5/2018     Intervention(s)  Therapist will assign homework of using safety plan; role-play effective communication skills; teach emotional regulation skills.    Objective #B  Client will feel less tired and more energy during the day.  Status: New - Date: 6/5/2018     Intervention(s)  Therapist will assign homework of social leisure planning and routine development; provide educational materials on what is self care; role-play assertiveness skills; teach emotional recognition/identification.     Goal 2: Client will better manage anxiety as evidenced by decreased score on LEYLA 7 from 5 to 0.     I will know I've met my goal when I have ewer anxious tendencies (talking loud, overly giggly and bright).      Objective #A (Client Action)    Client will learn 3 anxiety management strategies.  Status: New - Date: 6/5/2018     Intervention(s)  Therapist will assign homework of skill practice; provide educational materials on relaxation/mindfulness; role-play conflict management; teach the client how to perform a behavioral chain analysis.      Client has reviewed and agreed to the above plan.      Ning Montiel, [facility]  June 5, 2018                                                 Sivan Mathew     SAFETY PLAN:  Step 1: Warning signs / cues (Thoughts, images, mood, situation, behavior) that a crisis may be developing:    Thoughts: \"I don't matter\", \"People would be better off without me - get on without me\", \"I'm a burden\", \"I can't do this anymore\", \"I wish I was dead\", \"It would benefit everyone if I was dead\", \"I wish I could freeze and take a breath\", \"This is how it's always going to be\", \"You're going to be unhappy in the future - nothing is going to change\"    Images: obsessive thoughts of death or dying - thinking about bridges, how people would react, what would happen to " "finances and other responsibilities attached to me    Thinking Processes: ruminations (can't stop thinking about my problems), racing thoughts, intrusive thoughts (bothersome, unwanted thoughts that come out of nowhere), highly critical and negative thoughts    Mood: worsening depression and disinhibited (not caring about things or consequences)    Behaviors: isolating/withdrawing , using alcohol, impulsive, reckless behaviors (acting without thinking), not taking care of myself and not taking care of my responsibilities, can't talk to people, can't give direct eye contact, can't process situations     Situations: public shame: over sharing then feeling bad when others react, relationship problems, school stress   Step 2: Coping strategies - Things I can do to take my mind off of my problems without contacting another person (relaxation technique, physical activity):    Distress Tolerance Strategies:  listen to positive and upbeat music, sensory based activities/self-soothe with five senses, watch a funny youtSoukboard videos, pray, paced breathing/progressive muscle relaxation, intense exercise for 2-3 minutes, ride light rail      Physical Activities: go for a walk, exercise - running, deep breathing, weight lifting    Focus on helpful thoughts:  \"I will get through this\", \"It always passes\", \"Ride the wave\", think about an affirmation/compliment someone has said to me - \"you're my favorite\"  Step 3: People and social settings that provide distraction:   Name: Wendy (friend) Phone: [phone]   Name: Ashlee (roommate) Phone: [phone]   Name: Cesario (brother)  Phone: [phone]    Safe places - movie theater, park, Shinto, work   Step 4: Remind myself of people and things that are important to me and worth living for: family, close friends, coworkers, future relationship/family/dog, freedom and money to travel, helping global warming, having a job that matters  Step 5: When I am in crisis, I can ask these people " to help me use my safety plan:   Name: Wendy (friend) Phone: [phone]   Name: Ashlee (roommate) Phone: [phone]   Name: Cesario (brother)  Phone: [phone]  Step 6: Making the environment safe:     remove alcohol  Step 7: Professionals or agencies I can contact during a crisis:    [facility] Daytime and After Hours Crisis Number: 212-001-4588    Suicide Prevention Lifeline: 3-438-995-GQJD (4839)    Crisis Text Line Service (available 24 hours a day, 7 days a week): Text [place] to 726437  [facility] Crisis Services: [facility]- [phone]    Call 911 or go to my nearest emergency department.   I helped develop this safety plan and agree to use it when needed.  I have been given a copy of this plan.      Client signature _________________________________________________________________  Today s date:  6/5/2018  Adapted from Safety Plan Template 2008 Lisette Dorman and Joe Mcnally is reprinted with the express permission of the authors.  No portion of the Safety Plan Template may be reproduced without the express, written permission.  You can contact the authors at [email or web address] or [email or web address].

## 2018-08-21 ENCOUNTER — TELEPHONE (OUTPATIENT)
Dept: PSYCHOLOGY | Facility: CLINIC | Age: 18
End: 2018-08-21

## 2018-09-06 ENCOUNTER — OFFICE VISIT (OUTPATIENT)
Dept: PSYCHOLOGY | Facility: CLINIC | Age: 18
End: 2018-09-06
Payer: COMMERCIAL

## 2018-09-06 DIAGNOSIS — F41.1 GENERALIZED ANXIETY DISORDER: ICD-10-CM

## 2018-09-06 DIAGNOSIS — F33.1 MAJOR DEPRESSIVE DISORDER, RECURRENT, MODERATE (H): Primary | ICD-10-CM

## 2018-09-06 PROCEDURE — 90834 PSYTX W PT 45 MINUTES: CPT | Performed by: COUNSELOR

## 2018-09-06 ASSESSMENT — ANXIETY QUESTIONNAIRES
5. BEING SO RESTLESS THAT IT IS HARD TO SIT STILL: SEVERAL DAYS
GAD7 TOTAL SCORE: 9
3. WORRYING TOO MUCH ABOUT DIFFERENT THINGS: SEVERAL DAYS
1. FEELING NERVOUS, ANXIOUS, OR ON EDGE: MORE THAN HALF THE DAYS
7. FEELING AFRAID AS IF SOMETHING AWFUL MIGHT HAPPEN: NOT AT ALL
2. NOT BEING ABLE TO STOP OR CONTROL WORRYING: SEVERAL DAYS
IF YOU CHECKED OFF ANY PROBLEMS ON THIS QUESTIONNAIRE, HOW DIFFICULT HAVE THESE PROBLEMS MADE IT FOR YOU TO DO YOUR WORK, TAKE CARE OF THINGS AT HOME, OR GET ALONG WITH OTHER PEOPLE: VERY DIFFICULT
6. BECOMING EASILY ANNOYED OR IRRITABLE: MORE THAN HALF THE DAYS

## 2018-09-06 ASSESSMENT — PATIENT HEALTH QUESTIONNAIRE - PHQ9: 5. POOR APPETITE OR OVEREATING: MORE THAN HALF THE DAYS

## 2018-09-06 NOTE — MR AVS SNAPSHOT
MRN:[id number]                      After Visit Summary   9/6/2018    Sivan Mathew    MRN: [id number]           Visit Information        Provider Department      9/6/2018 3:30 PM Ning Montiel [facility] Generic      Your next 10 appointments already scheduled     Sep 12, 2018  7:00 AM CDT   PHYSICAL with Daly Rodriges PA-C   [facility]    [address] [place] [id number] [phone]            Sep 21, 2018  9:00 AM CDT   Return Visit with Ning Montiel [facility] ([facility])    [facility]  [address]  [facility] [id number] [phone]            Oct 05, 2018  9:00 AM CDT   Return Visit with Ning Montiel [facility] ([facility])    [facility]  [address]  [facility] [id number] [phone]            Oct 19, 2018  9:00 AM CDT   Return Visit with Ning Montiel [facility] ([facility])    [facility]  [address]  [facility] [id number] [phone]              MyChart Information     [employer] gives you secure access to your electronic health record. If you see a primary care provider, you can also send messages to your care team and make appointments. If you have questions, please call your primary care clinic.  If you do not have a primary care provider, please call [phone] and they will assist you.        Care EveryWhere ID     This is your Care EveryWhere ID. This could be used by other organizations to access your [place] medical records  [id number]        Equal Access to Services     LENNOX VILLALBA : Evelia Man, jada leonard, carter sapp, edel arroyo. So [facility] [phone].    ATENCIÓN: Si daphne guillermo  a gonzalez disposición servicios gratuitos de asistencia lingüística. Llame al [phone].    We comply with applicable federal civil rights laws and [place] laws. We do not discriminate on the basis of race, color, national origin, age, disability, sex, sexual orientation, or gender identity.

## 2018-09-07 ASSESSMENT — ANXIETY QUESTIONNAIRES: GAD7 TOTAL SCORE: 9

## 2018-09-07 ASSESSMENT — PATIENT HEALTH QUESTIONNAIRE - PHQ9: SUM OF ALL RESPONSES TO PHQ QUESTIONS 1-9: 15

## 2018-09-12 ENCOUNTER — OFFICE VISIT (OUTPATIENT)
Dept: FAMILY MEDICINE | Facility: CLINIC | Age: 18
End: 2018-09-12
Payer: COMMERCIAL

## 2018-09-12 VITALS
DIASTOLIC BLOOD PRESSURE: 76 MMHG | HEIGHT: 67 IN | WEIGHT: 223.25 LBS | TEMPERATURE: 98 F | SYSTOLIC BLOOD PRESSURE: 130 MMHG | HEART RATE: 76 BPM | BODY MASS INDEX: 35.04 KG/M2

## 2018-09-12 DIAGNOSIS — F33.1 MAJOR DEPRESSIVE DISORDER, RECURRENT, MODERATE (H): ICD-10-CM

## 2018-09-12 DIAGNOSIS — F41.1 GENERALIZED ANXIETY DISORDER: ICD-10-CM

## 2018-09-12 DIAGNOSIS — Z00.01 ENCOUNTER FOR ROUTINE ADULT MEDICAL EXAM WITH ABNORMAL FINDINGS: Primary | ICD-10-CM

## 2018-09-12 PROCEDURE — 99213 OFFICE O/P EST LOW 20 MIN: CPT | Mod: 25 | Performed by: PHYSICIAN ASSISTANT

## 2018-09-12 PROCEDURE — 99385 PREV VISIT NEW AGE 18-39: CPT | Performed by: PHYSICIAN ASSISTANT

## 2018-09-12 ASSESSMENT — ENCOUNTER SYMPTOMS
ABDOMINAL PAIN: 0
CHILLS: 0
JOINT SWELLING: 0
NERVOUS/ANXIOUS: 1
ARTHRALGIAS: 0
PALPITATIONS: 0
HEARTBURN: 0
COUGH: 0
NAUSEA: 0
BREAST MASS: 0
EYE PAIN: 0
FREQUENCY: 0
MYALGIAS: 0
WEAKNESS: 0
DYSURIA: 0
HEMATOCHEZIA: 0
HEMATURIA: 0
SORE THROAT: 0
CONSTIPATION: 0
DIZZINESS: 0
PARESTHESIAS: 0
FEVER: 0
SHORTNESS OF BREATH: 0
HEADACHES: 0
DIARRHEA: 0

## 2018-09-12 ASSESSMENT — ANXIETY QUESTIONNAIRES
GAD7 TOTAL SCORE: 11
3. WORRYING TOO MUCH ABOUT DIFFERENT THINGS: NEARLY EVERY DAY
1. FEELING NERVOUS, ANXIOUS, OR ON EDGE: MORE THAN HALF THE DAYS
7. FEELING AFRAID AS IF SOMETHING AWFUL MIGHT HAPPEN: MORE THAN HALF THE DAYS
2. NOT BEING ABLE TO STOP OR CONTROL WORRYING: MORE THAN HALF THE DAYS
5. BEING SO RESTLESS THAT IT IS HARD TO SIT STILL: SEVERAL DAYS
6. BECOMING EASILY ANNOYED OR IRRITABLE: SEVERAL DAYS
IF YOU CHECKED OFF ANY PROBLEMS ON THIS QUESTIONNAIRE, HOW DIFFICULT HAVE THESE PROBLEMS MADE IT FOR YOU TO DO YOUR WORK, TAKE CARE OF THINGS AT HOME, OR GET ALONG WITH OTHER PEOPLE: VERY DIFFICULT

## 2018-09-12 ASSESSMENT — PATIENT HEALTH QUESTIONNAIRE - PHQ9
SUM OF ALL RESPONSES TO PHQ QUESTIONS 1-9: 16
SUM OF ALL RESPONSES TO PHQ QUESTIONS 1-9: 16
10. IF YOU CHECKED OFF ANY PROBLEMS, HOW DIFFICULT HAVE THESE PROBLEMS MADE IT FOR YOU TO DO YOUR WORK, TAKE CARE OF THINGS AT HOME, OR GET ALONG WITH OTHER PEOPLE: VERY DIFFICULT
5. POOR APPETITE OR OVEREATING: NOT AT ALL

## 2018-09-12 NOTE — MR AVS SNAPSHOT
After Visit Summary   9/12/2018    Sivan Mathew    MRN: [id number]           Patient Information     Date Of Birth          2000        Visit Information        Provider Department      9/12/2018 7:00 AM Daly Rodriges PA-C [facility]        Today's Diagnoses     Encounter for routine adult medical exam with abnormal findings    -  1    Major depressive disorder, recurrent, moderate (H)        Generalized anxiety disorder          Care Instructions    Start Prozac 20 mg  Phone visit in 3 weeks, office visit in 2 months.      Daly Rodriges PA-C    Preventive Health Recommendations  Female Ages 18 to 20     Yearly exam:     See your health care provider every year in order to  o Review health changes.   o Discuss preventive care.    o Review your medicines if your doctor has prescribed any.      You should be tested each year for STDs (sexually transmitted diseases).       After age 20, talk to your provider about how often you should have cholesterol testing.      If you are at risk for diabetes, you should have a diabetes test (fasting glucose).     Shots:     Get a flu shot each year.     Get a tetanus shot every 10 years.     Consider getting the shot (vaccine) that prevents cervical cancer (Gardasil).    Nutrition:     Eat at least 5 servings of fruits and vegetables each day.    Eat whole-grain bread, whole-wheat pasta and brown rice instead of white grains and rice.    Get adequate Calcium and Vitamin D.     Lifestyle    Exercise at least 150 minutes a week each week (30 minutes a day, 5 days a week). This will help you control your weight and prevent disease.    No smoking.     Wear sunscreen to prevent skin cancer.    See your dentist every six months for an exam and cleaning.          Follow-ups after your visit        Your next 10 appointments already scheduled     Sep 21, 2018  9:00 AM CDT   Return Visit with Ning Montiel [facility]  [facility] ([facility]  [address]  [facility] [address]   [phone]            Oct 05, 2018  9:00 AM CDT   Return Visit with Ning Montiel [facility] ([facility]  [address]  [facility] [address]   [phone]            Oct 19, 2018  9:00 AM CDT   Return Visit with Ning Montiel [facility]  [address]  [facility] [address]   [phone]              Who to contact     If you have questions or need follow up information about today's clinic visit or your schedule please contact [facility] directly at [phone].  Normal or non-critical lab and imaging results will be communicated to you by MyChart, letter or phone within 4 business days after the clinic has received the results. If you do not hear from us within 7 days, please contact the clinic through [employer] or phone. If you have a critical or abnormal lab result, we will notify you by phone as soon as possible.  Submit refill requests through [employer] or call your pharmacy and they will forward the refill request to us. Please allow 3 business days for your refill to be completed.          Additional Information About Your Visit        MyChart Information     [employer] gives you secure access to your electronic health record. If you see a primary care provider, you can also send messages to your care team and make appointments. If you have questions, please call your primary care clinic.  If you do not have a primary care provider, please call [phone] and they will assist you.        Care EveryWhere ID     This is your Care EveryWhere ID. This could be used by other organizations to access your [place] medical records  [id number]        Your Vitals Were     Pulse Temperature Height BMI (Body Mass Index)        "   76 98  F (36.7  C) (Oral) 5' 7\" (1.702 m) 34.97 kg/m2         Blood Pressure from Last 3 Encounters:   09/12/18 130/76   01/15/18 129/79   01/02/18 135/83    Weight from Last 3 Encounters:   09/12/18 223 lb 4 oz (101.3 kg)   01/15/18 224 lb (101.6 kg)   01/02/18 225 lb (102.1 kg) (99 %)*     * Growth percentiles are based on [facility] 2-20 Years data.              We Performed the Following     OFFICE/OUTPT VISIT,DENIS JONES IV          Today's Medication Changes          These changes are accurate as of 9/12/18  7:50 AM.  If you have any questions, ask your nurse or doctor.               Start taking these medicines.        Dose/Directions    FLUoxetine 20 MG capsule   Commonly known as:  PROzac   Used for:  Major depressive disorder, recurrent, moderate (H), Generalized anxiety disorder   Started by:  Daly Rodriges PA-C        Dose:  20 mg   Take 1 capsule (20 mg) by mouth daily   Quantity:  30 capsule   Refills:  1            Where to get your medicines      These medications were sent to [id number] - [place], [place] - [address] AT [facility] [address]  [address], [place] [address]     Phone:  [phone]     FLUoxetine 20 MG capsule                Primary Care Provider Office Phone # Fax #    Daly Rodriges PA-C [phone]       [address] [facility] [address]        Equal Access to Services     LENNOX VILLALBA AH: Hadii silas granadoo Sorajesh, waaxda luqadaha, qaybta kaalmada adeegyada, edel arroyo. So [facility] [phone].    ATENCIÓN: Si habla español, tiene a gonzalez disposición servicios gratuitos de asistencia lingüística. Llame al [phone].    We comply with applicable federal civil rights laws and [place] laws. We do not discriminate on the basis of race, color, national origin, age, disability, sex, sexual orientation, or gender identity.            Thank you!     Thank you for choosing " [facility]  for your care. Our goal is always to provide you with excellent care. Hearing back from our patients is one way we can continue to improve our services. Please take a few minutes to complete the written survey that you may receive in the mail after your visit with us. Thank you!             Your Updated Medication List - Protect others around you: Learn how to safely use, store and throw away your medicines at www.disposemymeds.org.          This list is accurate as of 9/12/18  7:50 AM.  Always use your most recent med list.                   Brand Name Dispense Instructions for use Diagnosis    FLUoxetine 20 MG capsule    PROzac    30 capsule    Take 1 capsule (20 mg) by mouth daily    Major depressive disorder, recurrent, moderate (H), Generalized anxiety disorder       levonorgestrel 20 MCG/24HR IUD    MIRENA    1 each    1 each (20 mcg) by Intrauterine route once for 1 dose    Encounter for IUD insertion

## 2018-09-12 NOTE — PROGRESS NOTES
SUBJECTIVE:   CC: Sivan Mathew is an 18 year old woman who presents for preventive health visit.     Physical   Annual:     Getting at least 3 servings of Calcium per day:  NO    Bi-annual eye exam:  Yes    Dental care twice a year:  Yes    Sleep apnea or symptoms of sleep apnea:  None    Diet:  Regular (no restrictions)    Frequency of exercise:  4-5 days/week    Duration of exercise:  15-30 minutes    Taking medications regularly:  Yes    Medication side effects:  Not applicable    Additional concerns today:  YES (Discuss getting back on medication for depression and anxiety.)    Going to school for Sustainability at the [employer].  Part time job at the [employer].  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    Depression and Anxiety Follow-Up    Status since last visit: No change, doesn't think this has gotten any worse or better.  High anxiety, seems to be very cyclical. Does really well for a few days, then crashes.  Can't concentrate, can't focus.    Feels this is affecting work , social relationships, school.  When others are talking, can't comprehend what they are saying all of the time, etc. Having problems sleeping, can't turn her mind off, waking frequently, hard to concentrate during class, always worrying,     Zoloft was not helpful in High School.  Mom is on Prozac and works well for her.    Has been seeing a therapist regularly who recommended that she come discuss medication.    Other associated symptoms:sleep issues    Complicating factors:     Significant life event: No     Current substance abuse: None    Had IUD placed in January 2017-loves this, working well, only having period once every 2-3 months.    PHQ-9 7/26/2018 9/6/2018 9/12/2018   Total Score 16 15 16   Q9: Suicide Ideation Several days Not at all Not at all     LEYLA-7 SCORE 7/3/2018 7/26/2018 9/6/2018   Total Score - - -   Total Score 8 9 9     In the past  two weeks have you had thoughts of suicide or self-harm?  No.    Do you have concerns about your personal safety or the safety of others?   No  PHQ-9  English  PHQ-9   Any Language  LEYLA-7  Suicide Assessment Five-step Evaluation and Treatment (SAFE-T)    Today's PHQ-2 Score:   PHQ-2 ( 1999 [employer]) 9/12/2018   Q1: Little interest or pleasure in doing things 2   Q2: Feeling down, depressed or hopeless 2   PHQ-2 Score 4   Q1: Little interest or pleasure in doing things More than half the days   Q2: Feeling down, depressed or hopeless More than half the days   PHQ-2 Score 4     Abuse: Current or Past(Physical, Sexual or Emotional)- No  Do you feel safe in your environment - Yes    Social History   Substance Use Topics     Smoking status: Never Smoker     Smokeless tobacco: Never Used     Alcohol use No     Alcohol Use 9/12/2018   If you drink alcohol do you typically have greater than 3 drinks per day OR greater than 7 drinks per week? No   No flowsheet data found.    Reviewed orders with patient.  Reviewed health maintenance and updated orders accordingly - Yes    Mammogram not appropriate for this patient based on age.    Pertinent mammograms are reviewed under the imaging tab.  History of abnormal Pap smear: NO - under age 21, PAP not appropriate for age     Reviewed and updated as needed this visit by clinical staff  Tobacco  Allergies  Meds  Problems  Med Hx  Surg Hx  Fam Hx  Soc Hx          Reviewed and updated as needed this visit by Provider  Problems          Review of Systems   Constitutional: Negative for chills and fever.   HENT: Negative for congestion, ear pain, hearing loss and sore throat.    Eyes: Negative for pain and visual disturbance.   Respiratory: Negative for cough and shortness of breath.    Cardiovascular: Negative for chest pain, palpitations and peripheral edema.   Gastrointestinal: Negative for abdominal pain, constipation, diarrhea, heartburn, hematochezia and nausea.   Breasts:   "Negative for tenderness, breast mass and discharge.   Genitourinary: Negative for dysuria, frequency, genital sores, hematuria, pelvic pain, urgency, vaginal bleeding and vaginal discharge.   Musculoskeletal: Negative for arthralgias, joint swelling and myalgias.   Skin: Negative for rash.   Neurological: Negative for dizziness, weakness, headaches and paresthesias.   Psychiatric/Behavioral: Positive for mood changes. The patient is nervous/anxious.       OBJECTIVE:   /76 (BP Location: Right arm, Cuff Size: Adult Large)  Pulse 76  Temp 98  F (36.7  C) (Oral)  Ht 5' 7\" (1.702 m)  Wt 223 lb 4 oz (101.3 kg)  BMI 34.97 kg/m2  Physical Exam  GENERAL: healthy, alert and no distress  EYES: Eyes grossly normal to inspection, PERRL and conjunctivae and sclerae normal  HENT: ear canals and TM's normal, nose and mouth without ulcers or lesions  NECK: no adenopathy, no asymmetry, masses, or scars and thyroid normal to palpation  RESP: lungs clear to auscultation - no rales, rhonchi or wheezes  BREAST: normal without masses, tenderness or nipple discharge and no palpable axillary masses or adenopathy  CV: regular rate and rhythm, normal S1 S2, no S3 or S4, no murmur, click or rub, no peripheral edema and peripheral pulses strong  ABDOMEN: soft, nontender, no hepatosplenomegaly, no masses and bowel sounds normal  MS: no gross musculoskeletal defects noted, no edema  SKIN: no suspicious lesions or rashes  NEURO: Normal strength and tone, mentation intact and speech normal  PSYCH: mentation appears normal, affect normal/bright    Diagnostic Test Results:  none     ASSESSMENT/PLAN:   1. Encounter for routine adult medical exam with abnormal findings  Follow up pending above results. Encouraged healthy diet and regular exercise, monthy SBE, and yearly exams.    2. Major depressive disorder, recurrent, moderate (H)  3. Generalized anxiety disorder  Not well controlled with therapy alone.  Below medication as directed with " "full discussion of risks, benefits, and possible adverse effects.  Phone visit in 3 weeks, office visit in 2 months.  Encouraged healthy diet, regular exercise, etc.  - FLUoxetine (PROZAC) 20 MG capsule; Take 1 capsule (20 mg) by mouth daily  Dispense: 30 capsule; Refill: 1  - OFFICE/OUTPT VISIT,EST,LEVL IV    COUNSELING:  Reviewed preventive health counseling, as reflected in patient instructions    BP Readings from Last 1 Encounters:   09/12/18 130/76     Estimated body mass index is 34.97 kg/(m^2) as calculated from the following:    Height as of this encounter: 5' 7\" (1.702 m).    Weight as of this encounter: 223 lb 4 oz (101.3 kg).    BP Screening:   Last 3 BP Readings:    BP Readings from Last 3 Encounters:   09/12/18 130/76   01/15/18 129/79   01/02/18 135/83       The following was recommended to the patient:  Re-screen BP within a year and recommended lifestyle modifications  Weight management plan: Discussed healthy diet and exercise guidelines and patient will follow up in [address] [place] [address] clinic to re-evaluate.     reports that she has never smoked. She has never used smokeless tobacco.      Counseling Resources:  ATP IV Guidelines  Pooled Cohorts Equation Calculator  Breast Cancer Risk Calculator  FRAX Risk Assessment  ICSI Preventive Guidelines  Dietary Guidelines for Americans, 2010  USDA's MyPlate  ASA Prophylaxis  Lung CA Screening    Daly Rodriges PA-C  [facility]  Answers for HPI/ROS submitted by the patient on 9/12/2018   PHQ-2 Score: 4  If you checked off any problems, how difficult have these problems made it for you to do your work, take care of things at home, or get along with other people?: Very difficult  PHQ9 TOTAL SCORE: 16    "

## 2018-09-12 NOTE — PATIENT INSTRUCTIONS
Start Prozac 20 mg  Phone visit in 3 weeks, office visit in 2 months.      Daly Rodriges PA-C    Preventive Health Recommendations  Female Ages 18 to 20     Yearly exam:     See your health care provider every year in order to  o Review health changes.   o Discuss preventive care.    o Review your medicines if your doctor has prescribed any.      You should be tested each year for STDs (sexually transmitted diseases).       After age 20, talk to your provider about how often you should have cholesterol testing.      If you are at risk for diabetes, you should have a diabetes test (fasting glucose).     Shots:     Get a flu shot each year.     Get a tetanus shot every 10 years.     Consider getting the shot (vaccine) that prevents cervical cancer (Gardasil).    Nutrition:     Eat at least 5 servings of fruits and vegetables each day.    Eat whole-grain bread, whole-wheat pasta and brown rice instead of white grains and rice.    Get adequate Calcium and Vitamin D.     Lifestyle    Exercise at least 150 minutes a week each week (30 minutes a day, 5 days a week). This will help you control your weight and prevent disease.    No smoking.     Wear sunscreen to prevent skin cancer.    See your dentist every six months for an exam and cleaning.

## 2018-09-13 ASSESSMENT — ANXIETY QUESTIONNAIRES: GAD7 TOTAL SCORE: 11

## 2018-09-13 ASSESSMENT — PATIENT HEALTH QUESTIONNAIRE - PHQ9: SUM OF ALL RESPONSES TO PHQ QUESTIONS 1-9: 16

## 2018-09-21 ENCOUNTER — OFFICE VISIT (OUTPATIENT)
Dept: PSYCHOLOGY | Facility: CLINIC | Age: 18
End: 2018-09-21
Payer: COMMERCIAL

## 2018-09-21 DIAGNOSIS — F33.1 MAJOR DEPRESSIVE DISORDER, RECURRENT, MODERATE (H): Primary | ICD-10-CM

## 2018-09-21 DIAGNOSIS — F41.1 GENERALIZED ANXIETY DISORDER: ICD-10-CM

## 2018-09-21 PROCEDURE — 90834 PSYTX W PT 45 MINUTES: CPT | Performed by: COUNSELOR

## 2018-09-21 ASSESSMENT — ANXIETY QUESTIONNAIRES
6. BECOMING EASILY ANNOYED OR IRRITABLE: MORE THAN HALF THE DAYS
IF YOU CHECKED OFF ANY PROBLEMS ON THIS QUESTIONNAIRE, HOW DIFFICULT HAVE THESE PROBLEMS MADE IT FOR YOU TO DO YOUR WORK, TAKE CARE OF THINGS AT HOME, OR GET ALONG WITH OTHER PEOPLE: SOMEWHAT DIFFICULT
1. FEELING NERVOUS, ANXIOUS, OR ON EDGE: MORE THAN HALF THE DAYS
3. WORRYING TOO MUCH ABOUT DIFFERENT THINGS: NEARLY EVERY DAY
2. NOT BEING ABLE TO STOP OR CONTROL WORRYING: MORE THAN HALF THE DAYS
GAD7 TOTAL SCORE: 12
7. FEELING AFRAID AS IF SOMETHING AWFUL MIGHT HAPPEN: SEVERAL DAYS
5. BEING SO RESTLESS THAT IT IS HARD TO SIT STILL: MORE THAN HALF THE DAYS

## 2018-09-21 ASSESSMENT — PATIENT HEALTH QUESTIONNAIRE - PHQ9: 5. POOR APPETITE OR OVEREATING: NOT AT ALL

## 2018-09-21 NOTE — MR AVS SNAPSHOT
MRN:[id number]                      After Visit Summary   9/21/2018    Sivan Mathew    MRN: [id number]           Visit Information        Provider Department      9/21/2018 9:00 AM Ning Montiel [facility] Generic      Your next 10 appointments already scheduled     Oct 02, 2018 11:00 AM CDT   Telephone Visit with Daly Rodriges PA-C   [facility] ([address] [id number] [phone]           Note: this is not an onsite visit; there is no need to come to the facility.            Oct 05, 2018  9:00 AM CDT   Return Visit with Ning Montiel [facility] ([place] [address] [place] [id number] [phone]            Oct 19, 2018  9:00 AM CDT   Return Visit with Ning Montiel [facility] ([address] [address]   [phone]            Nov 13, 2018  9:00 AM CST   SHORT with Daly Rodriges PA-C   [facility] ([address] [id number] [phone]              MyChart Information     [facility] gives you secure access to your electronic health record. If you see a primary care provider, you can also send messages to your care team and make appointments. If you have questions, please call your primary care clinic.  If you do not have a primary care provider, please call [phone] and they will assist you.        Care EveryWhere ID     This is your Care EveryWhere ID. This could be used by other organizations to access your [place] medical records  [id number]        Equal Access to Services     LENNOX VILLALBA : Evelia Man, jada leonard, carter sapp, edel silva  catherine boyle ah. So [facility] [phone].    ATENCIÓN: Si habla español, tiene a gonzalez disposición servicios gratuitos de asistencia lingüística. Llame al [phone].    We comply with applicable federal civil rights laws and [place] laws. We do not discriminate on the basis of race, color, national origin, age, disability, sex, sexual orientation, or gender identity.

## 2018-09-21 NOTE — PROGRESS NOTES
Progress Note    Client Name: Sivan Mathew  Date: 9/21/2018         Service Type: Individual      Session Start Time: 9:00a  Session End Time: 9:45a      Session Length: 45 minutes     Session #: 9     Attendees: Client attended alone    Treatment Plan Last Reviewed: 9/21/2018  PHQ-9 / LEYLA-7 : 15 & 12       DATA      Progress Since Last Session (Related to Symptoms / Goals / Homework):   Symptoms: Worsened, see Epic for PHQ 9 and LEYLA 7 updates     Homework: Partially completed and continued - client will work on balancing schedule and communicating with friends about communication. By next appt, client will complete grocery shopping and eat at least 2 meals a day.       Episode of Care Goals: Satisfactory progress - ACTION (Actively working towards change); Intervened by reinforcing change plan / affirming steps taken     Current / Ongoing Stressors and Concerns:   Reported worsened anxiety and mood - feeling stretched thin, overwhelmed, stressed, and not eating regularly; acknowledged not eating consistenrly due to lack of healthy/tasty food at home, not wanting to spend extra money, and attempting to control life via controlling food intake; expressed wanting to scale back on priorities, but not sure what she can cut back on - agreed that self-care needs to be a priority and that she will cut back on all obligations to some degree.      Treatment Objective(s) Addressed in This Session:    Client will learn 3 anxiety management strategies. Client will identify and practice 3 new strategies for dealing with strong emotions as they relate to SI. Client will feel less tired and more energy during the day.     Intervention:   CBT: identity and challenge self-defeating and maladaptive thoughts, self-talk, and behaviors, teach and reinforce proactive interpersonal communication and problem solving skills, identify emotions and potential underlying reasons/functions of  "emotions, teach assertiveness skills, reinforce self-awareness and proactive problem solving and help seeking behaviors, teach identifying self-affirmations, assess eating behaviors/patterns; Motivational Interviewing: open-ended questions, affirmations, reflections, reinforce personal control and choices, challenge sustain talk, evoke change talk, reinforce natural coping skills, explore ambivalence and roadblocks to change, teach OARS and active listening skills; DBT: teach interpersonal effectiveness and dialectical thinking as it relates to relationships (self vs others), reinforce wise mind and dialectical thinking to address black and white thinking, teach active listening for effective communication, reinforce PLEASE skills          ASSESSMENT: Current Emotional / Mental Status (status of significant symptoms):   Risk status (Self / Other harm or suicidal ideation)   Client reports the following current fears or concerns for personal safety: lives in off campus housing.  Client reports the following current or recent suicidal ideation or behaviors: onset - middle school, reports low self esteem, weight issues, and experienced bullying; states SI comes in waves and continued into college - no SI freshman year, but this year SI has worsened - hopelessness for the future, \"it's not worth it, what's the point, you haven't done anything, you won't do anything, accomplish anything\"; denies attempts, hospitalizations, plans; hardik by distracting, hanging out with friends, and getting \"really into something like watching a show\".   Client denies current or recent homicidal ideation or behaviors.  Client reports current or recent self injurious behavior or ideation including onset - high school, superficial cutting, no scars, last self-harm Sept 2017.  Client denies other safety concerns.  A safety and risk management plan has been developed at this time, see below  Client reports there are no firearms in the " house.     Appearance:   Appropriate    Eye Contact:   Good    Psychomotor Behavior: Normal    Attitude:   Cooperative    Orientation:   All   Speech    Rate / Production: Normal     Volume:  Normal    Mood:    Anxious Stressed   Affect:    Appropriate Mood congruent    Thought Content:  Rumination    Thought Form:  Coherent  Logical  Circumstantial   Insight:    Good     Medication Review:   No current psychiatric medications prescribed     Medication Compliance:   NA     Changes in Health Issues:   None reported     Chemical Use Review:   Substance Use: Chemical use reviewed, no active concerns identified      Tobacco Use: No current tobacco use     Collateral Reports Completed:   Not Applicable      PLAN: (Client Tasks / Therapist Tasks / Other)  Therapist will assign homework of using safety plan; role-play effective communication skills; teach emotional regulation skills. Continue to reinforce interpersonal effectiveness and self-awareness. Continue to reinforce active listening, self-care, and help seeking behaviors. Continue to reinforce self-care and effective communication to reduce negative self-image.         Ning Montiel [facility]                                                         ________________________________________________________________________    Treatment Plan    Client's Name: Sivan Mathew  YOB: 2000    Date: 6/5/2018    Diagnoses: 300.02 (F41.1) Generalized Anxiety Disorder & 296.32 (F33.1) Major Depressive Disorder, Recurrent Episode, Moderate; Hx Anorexia Nervosa  Psychosocial & Contextual Factors: School stress, relationship stress  WHODAS: 16    Referral / Collaboration:  Was/were discussed and client will pursue - see PCP for medication.    Anticipated number of session or this episode of care: 12      MeasurableTreatment Goal(s) related to diagnosis / functional impairment(s)  Goal 1: Client will improve mood as evidenced by decreased score on PHQ 9 from 10  "(moderate) to 5 or less (mild).    I will know I've met my goal when I have fewer intrusive thoughts and more energy.      Objective #A (Client Action)    Client will identify and practice 3 new strategies for dealing with strong emotions as they relate to SI.  Status: New - Date: 6/5/2018     Intervention(s)  Therapist will assign homework of using safety plan; role-play effective communication skills; teach emotional regulation skills.    Objective #B  Client will feel less tired and more energy during the day.  Status: New - Date: 6/5/2018     Intervention(s)  Therapist will assign homework of social leisure planning and routine development; provide educational materials on what is self care; role-play assertiveness skills; teach emotional recognition/identification.     Goal 2: Client will better manage anxiety as evidenced by decreased score on LEYLA 7 from 5 to 0.     I will know I've met my goal when I have ewer anxious tendencies (talking loud, overly giggly and bright).      Objective #A (Client Action)    Client will learn 3 anxiety management strategies.  Status: New - Date: 6/5/2018     Intervention(s)  Therapist will assign homework of skill practice; provide educational materials on relaxation/mindfulness; role-play conflict management; teach the client how to perform a behavioral chain analysis.      Client has reviewed and agreed to the above plan.      Ning Montiel, [facility]  June 5, 2018                                                 Sivan Mathew     SAFETY PLAN:  Step 1: Warning signs / cues (Thoughts, images, mood, situation, behavior) that a crisis may be developing:    Thoughts: \"I don't matter\", \"People would be better off without me - get on without me\", \"I'm a burden\", \"I can't do this anymore\", \"I wish I was dead\", \"It would benefit everyone if I was dead\", \"I wish I could freeze and take a breath\", \"This is how it's always going to be\", \"You're going to be unhappy in the future - nothing is " "going to change\"    Images: obsessive thoughts of death or dying - thinking about bridges, how people would react, what would happen to finances and other responsibilities attached to me    Thinking Processes: ruminations (can't stop thinking about my problems), racing thoughts, intrusive thoughts (bothersome, unwanted thoughts that come out of nowhere), highly critical and negative thoughts    Mood: worsening depression and disinhibited (not caring about things or consequences)    Behaviors: isolating/withdrawing , using alcohol, impulsive, reckless behaviors (acting without thinking), not taking care of myself and not taking care of my responsibilities, can't talk to people, can't give direct eye contact, can't process situations     Situations: public shame: over sharing then feeling bad when others react, relationship problems, school stress   Step 2: Coping strategies - Things I can do to take my mind off of my problems without contacting another person (relaxation technique, physical activity):    Distress Tolerance Strategies:  listen to positive and upbeat music, sensory based activities/self-soothe with five senses, watch a Cuiker videos, pray, paced breathing/progressive muscle relaxation, intense exercise for 2-3 minutes, ride light rail      Physical Activities: go for a walk, exercise - running, deep breathing, weight lifting    Focus on helpful thoughts:  \"I will get through this\", \"It always passes\", \"Ride the wave\", think about an affirmation/compliment someone has said to me - \"you're my favorite\"  Step 3: People and social settings that provide distraction:   Name: Wendy (friend) Phone: [phone]   Name: Ashlee (roommate) Phone: [phone]   Name: Cesario (brother)  Phone: [phone]    Safe places - movie theater, park, [employer], work   Step 4: Remind myself of people and things that are important to me and worth living for: family, close friends, coworkers, future " relationship/family/dog, freedom and money to travel, helping global warming, having a job that matters  Step 5: When I am in crisis, I can ask these people to help me use my safety plan:   Name: Wendy (friend) Phone: [phone]   Name: Ashlee (roommate) Phone: [phone]   Name: Cesario (brother)  Phone: [phone]  Step 6: Making the environment safe:     remove alcohol  Step 7: Professionals or agencies I can contact during a crisis:    [facility] Daytime and After Hours Crisis Number: [phone]    Suicide Prevention Lifeline: 8-607-752-TALK (3688)    Crisis Text Line Service (available 24 hours a day, 7 days a week): Text [place] to 488409  Local Crisis Services: [facility] -- [phone]    Call 911 or go to my nearest emergency department.   I helped develop this safety plan and agree to use it when needed.  I have been given a copy of this plan.      Client signature _________________________________________________________________  Today s date:  6/5/2018  Adapted from Safety Plan Template 2008 Lisette Dorman and Joe Mcnally is reprinted with the express permission of the authors.  No portion of the Safety Plan Template may be reproduced without the express, written permission.  You can contact the authors at [email or web address] or [email or web address].

## 2018-09-22 ASSESSMENT — PATIENT HEALTH QUESTIONNAIRE - PHQ9: SUM OF ALL RESPONSES TO PHQ QUESTIONS 1-9: 15

## 2018-09-22 ASSESSMENT — ANXIETY QUESTIONNAIRES: GAD7 TOTAL SCORE: 12

## 2018-10-02 ENCOUNTER — VIRTUAL VISIT (OUTPATIENT)
Dept: FAMILY MEDICINE | Facility: CLINIC | Age: 18
End: 2018-10-02
Payer: COMMERCIAL

## 2018-10-02 DIAGNOSIS — F41.1 GENERALIZED ANXIETY DISORDER: ICD-10-CM

## 2018-10-02 DIAGNOSIS — F33.1 MAJOR DEPRESSIVE DISORDER, RECURRENT, MODERATE (H): Primary | ICD-10-CM

## 2018-10-02 PROCEDURE — 99207 ZZC NO BILLABLE SERVICE THIS VISIT: CPT | Performed by: PHYSICIAN ASSISTANT

## 2018-10-02 RX ORDER — FLUOXETINE 40 MG/1
40 CAPSULE ORAL DAILY
Qty: 30 CAPSULE | Refills: 1 | Status: SHIPPED | OUTPATIENT
Start: 2018-10-02 | End: 2018-11-28

## 2018-10-02 ASSESSMENT — PATIENT HEALTH QUESTIONNAIRE - PHQ9: 5. POOR APPETITE OR OVEREATING: NOT AT ALL

## 2018-10-02 ASSESSMENT — ANXIETY QUESTIONNAIRES
6. BECOMING EASILY ANNOYED OR IRRITABLE: SEVERAL DAYS
7. FEELING AFRAID AS IF SOMETHING AWFUL MIGHT HAPPEN: SEVERAL DAYS
3. WORRYING TOO MUCH ABOUT DIFFERENT THINGS: MORE THAN HALF THE DAYS
1. FEELING NERVOUS, ANXIOUS, OR ON EDGE: SEVERAL DAYS
5. BEING SO RESTLESS THAT IT IS HARD TO SIT STILL: SEVERAL DAYS
2. NOT BEING ABLE TO STOP OR CONTROL WORRYING: SEVERAL DAYS
GAD7 TOTAL SCORE: 7

## 2018-10-02 NOTE — MR AVS SNAPSHOT
After Visit Summary   10/2/2018    Sivan Mathew    MRN: [id number]           Patient Information     Date Of Birth          2000        Visit Information        Provider Department      10/2/2018 11:00 AM Daly Rodriges PA-C [facility]        Today's Diagnoses     Major depressive disorder, recurrent, moderate (H)    -  1    Generalized anxiety disorder           Follow-ups after your visit        Your next 10 appointments already scheduled     Oct 05, 2018  9:00 AM CDT   Return Visit with Ning Montiel [facility] ([address] [address]   [phone]            Oct 19, 2018  9:00 AM CDT   Return Visit with Ning Montiel [address] [address]   [phone]            Nov 13, 2018  9:00 AM CST   SHORT with Daly Rodriges PA-C   [facility] ([facility])    [address] [place] [address] [phone]              Who to contact     If you have questions or need follow up information about today's clinic visit or your schedule please contact [facility] directly at [phone].  Normal or non-critical lab and imaging results will be communicated to you by MyChart, letter or phone within 4 business days after the clinic has received the results. If you do not hear from us within 7 days, please contact the clinic through MyChart or phone. If you have a critical or abnormal lab result, we will notify you by phone as soon as possible.  Submit refill requests through [employer] or call your pharmacy and they will forward the refill request to us. Please allow 3 business days for your refill to be completed.          Additional Information About Your Visit        MyChart Information      [employer] gives you secure access to your electronic health record. If you see a primary care provider, you can also send messages to your care team and make appointments. If you have questions, please call your primary care clinic.  If you do not have a primary care provider, please call [phone] and they will assist you.        Care EveryWhere ID     This is your Care EveryWhere ID. This could be used by other organizations to access your [facility] medical records  [id number]         Blood Pressure from Last 3 Encounters:   09/12/18 130/76   01/15/18 129/79   01/02/18 135/83    Weight from Last 3 Encounters:   09/12/18 223 lb 4 oz (101.3 kg)   01/15/18 224 lb (101.6 kg)   01/02/18 225 lb (102.1 kg) (99 %)*     * Growth percentiles are based on [facility] 2-20 Years data.              Today, you had the following     No orders found for display         Today's Medication Changes          These changes are accurate as of 10/2/18  9:43 PM.  If you have any questions, ask your nurse or doctor.               These medicines have changed or have updated prescriptions.        Dose/Directions    * FLUoxetine 20 MG capsule   Commonly known as:  PROzac   This may have changed:  Another medication with the same name was added. Make sure you understand how and when to take each.   Used for:  Major depressive disorder, recurrent, moderate (H), Generalized anxiety disorder   Changed by:  Daly Rodriges PA-C        Dose:  20 mg   Take 1 capsule (20 mg) by mouth daily   Quantity:  30 capsule   Refills:  1       * FLUoxetine 40 MG capsule   Commonly known as:  PROzac   This may have changed:  You were already taking a medication with the same name, and this prescription was added. Make sure you understand how and when to take each.   Used for:  Major depressive disorder, recurrent, moderate (H), Generalized anxiety disorder   Changed by:  Daly Rodriges PA-C        Dose:  40 mg   Take 1 capsule (40 mg) by mouth daily    Quantity:  30 capsule   Refills:  1       * Notice:  This list has 2 medication(s) that are the same as other medications prescribed for you. Read the directions carefully, and ask your doctor or other care provider to review them with you.         Where to get your medicines      These medications were sent to [id number] - [place], [place] - [address] AT [facility] [address]  [address], [place] [address]     Phone:  [phone]     FLUoxetine 40 MG capsule                Primary Care Provider Office Phone # Fax #    Daly Rodriges PA-C [phone]       [address] [facility] [address]        Equal Access to Services     LENNOX VILLALBA : Hadii silas beckman hadasho Soomaali, waaxda luqadaha, qaybta kaalmada adeegyada, waxkalpesh boyle . So [facility] [phone].    ATENCIÓN: Si habla español, tiene a gonzalez disposición servicios gratuitos de asistencia lingüística. [facility] [phone].    We comply with applicable federal civil rights laws and [place] laws. We do not discriminate on the basis of race, color, national origin, age, disability, sex, sexual orientation, or gender identity.            Thank you!     Thank you for choosing [facility]  for your care. Our goal is always to provide you with excellent care. Hearing back from our patients is one way we can continue to improve our services. Please take a few minutes to complete the written survey that you may receive in the mail after your visit with us. Thank you!             Your Updated Medication List - Protect others around you: Learn how to safely use, store and throw away your medicines at www.disposemymeds.org.          This list is accurate as of 10/2/18  9:43 PM.  Always use your most recent med list.                   Brand Name Dispense Instructions for use Diagnosis    * FLUoxetine 20 MG capsule    PROzac    30 capsule    Take 1  capsule (20 mg) by mouth daily    Major depressive disorder, recurrent, moderate (H), Generalized anxiety disorder       * FLUoxetine 40 MG capsule    PROzac    30 capsule    Take 1 capsule (40 mg) by mouth daily    Major depressive disorder, recurrent, moderate (H), Generalized anxiety disorder       levonorgestrel 20 MCG/24HR IUD    MIRENA    1 each    1 each (20 mcg) by Intrauterine route once for 1 dose    Encounter for IUD insertion       * Notice:  This list has 2 medication(s) that are the same as other medications prescribed for you. Read the directions carefully, and ask your doctor or other care provider to review them with you.

## 2018-10-02 NOTE — PROGRESS NOTES
"Sivan Mathew is a 18 year old female who is being evaluated via a telephone visit.      The patient has been notified of following:     \"This telephone visit will be conducted via a call between you and your physician/provider. We have found that certain health care needs can be provided without the need for a physical exam.  This service lets us provide the care you need with a short phone conversation.  If a prescription is necessary we can send it directly to your pharmacy.  If lab work is needed we can place an order for that and you can then stop by our lab to have the test done at a later time.    We will bill your insurance company for this service.  Please check with your medical insurance if this type of visit is covered. You may be responsible for the cost of this type of visit if insurance coverage is denied.  The typical cost is $30 (10min), $59 (11-20min) and $85 (21-30min).  Most often these visits are shorter than 10 minutes.    If during the course of the call the physician/provider feels a telephone visit is not appropriate, you will not be charged for this service.\"       Consent has been obtained for this service by care team member: yes.   See the scanned image in the medical record.    Sivan Mathew complains of  Anxiety and Depression      I have reviewed and updated the patient's Past Medical History, Social History, Family History and Medication List.    ALLERGIES  Review of patient's allergies indicates no known allergies.    Rebekah Morrison CMA ([facility])   (MA signature)    Additional provider notes: Follow up call after starting Prozac for depression/anxiety. Overall she feels that things are going really well.  Feels improvements but would like to try higher dose. Has noted possible side effect of shakiness in her hands but not sure if it is 2/2 to medication.    Assessment/Plan:  1. Major depressive disorder, recurrent, moderate (H)  2. Generalized anxiety disorder  Improvements but " would like to increase dose.  Increase dose to 40 mg per day. If shaking worsens, will change medication.  F/u in 6 weeks as scheduled.  - FLUoxetine (PROZAC) 40 MG capsule; Take 1 capsule (40 mg) by mouth daily  Dispense: 30 capsule; Refill: 1  .    I have reviewed the note as documented above.  This accurately captures the substance of my conversation with the patient,    Total time of call between patient and provider was 3 minutes

## 2018-10-03 ASSESSMENT — PATIENT HEALTH QUESTIONNAIRE - PHQ9: SUM OF ALL RESPONSES TO PHQ QUESTIONS 1-9: 13

## 2018-10-03 ASSESSMENT — ANXIETY QUESTIONNAIRES: GAD7 TOTAL SCORE: 7

## 2018-10-05 ENCOUNTER — OFFICE VISIT (OUTPATIENT)
Dept: PSYCHOLOGY | Facility: CLINIC | Age: 18
End: 2018-10-05
Payer: COMMERCIAL

## 2018-10-05 DIAGNOSIS — F33.1 MAJOR DEPRESSIVE DISORDER, RECURRENT, MODERATE (H): Primary | ICD-10-CM

## 2018-10-05 DIAGNOSIS — F41.1 GENERALIZED ANXIETY DISORDER: ICD-10-CM

## 2018-10-05 PROCEDURE — 90834 PSYTX W PT 45 MINUTES: CPT | Performed by: COUNSELOR

## 2018-10-05 ASSESSMENT — ANXIETY QUESTIONNAIRES
GAD7 TOTAL SCORE: 13
3. WORRYING TOO MUCH ABOUT DIFFERENT THINGS: NEARLY EVERY DAY
5. BEING SO RESTLESS THAT IT IS HARD TO SIT STILL: SEVERAL DAYS
1. FEELING NERVOUS, ANXIOUS, OR ON EDGE: NEARLY EVERY DAY
6. BECOMING EASILY ANNOYED OR IRRITABLE: MORE THAN HALF THE DAYS
7. FEELING AFRAID AS IF SOMETHING AWFUL MIGHT HAPPEN: SEVERAL DAYS
IF YOU CHECKED OFF ANY PROBLEMS ON THIS QUESTIONNAIRE, HOW DIFFICULT HAVE THESE PROBLEMS MADE IT FOR YOU TO DO YOUR WORK, TAKE CARE OF THINGS AT HOME, OR GET ALONG WITH OTHER PEOPLE: VERY DIFFICULT
2. NOT BEING ABLE TO STOP OR CONTROL WORRYING: MORE THAN HALF THE DAYS

## 2018-10-05 ASSESSMENT — PATIENT HEALTH QUESTIONNAIRE - PHQ9: 5. POOR APPETITE OR OVEREATING: SEVERAL DAYS

## 2018-10-05 NOTE — PROGRESS NOTES
Progress Note    Client Name: Sivan Mathew  Date: 10/5/2018         Service Type: Individual      Session Start Time: 9:00a  Session End Time: 9:45a      Session Length: 45 minutes     Session #: 10     Attendees: Client attended alone    Treatment Plan Last Reviewed: 10/5/2018  PHQ-9 / LEYLA-7 : 19 & 13       DATA      Progress Since Last Session (Related to Symptoms / Goals / Homework):   Symptoms: Worsened, see Epic for PHQ 9 and LEYLA 7 updates     Homework: Partially completed and continued - client will complete grocery shopping and eat at least 2 meals a day (went grocery shopping, still working to prep and eat grocery). By next appt, client will make a decision about schedule change and look for other options for support.       Episode of Care Goals: Satisfactory progress - ACTION (Actively working towards change); Intervened by reinforcing change plan / affirming steps taken     Current / Ongoing Stressors and Concerns:   Continued worsened anxiety and mood - feeling stretched thin, overwhelmed, stressed, and not eating regularly; with feedback, acknowledged tendency to overbook schedule as a strategy to avoid working on herself; reported coming to realize consequences of doing too much (failed physics quiz, compromised relationships, crashing when she gets home, health and sleep is taking a toll, low self-esteem); reported desire to make a change, but ambivalent about what to change - agreed to assess schedule and other options for support/change (e.g., disability office, parents...).     Treatment Objective(s) Addressed in This Session:    Client will learn 3 anxiety management strategies. Client will identify and practice 3 new strategies for dealing with strong emotions as they relate to SI. Client will feel less tired and more energy during the day.     Intervention:   CBT: identity and challenge self-defeating and maladaptive thoughts, self-talk, and  "behaviors, teach and reinforce proactive interpersonal communication and problem solving skills, identify emotions and potential underlying reasons/functions of emotions, teach assertiveness skills, reinforce self-awareness and proactive problem solving and help seeking behaviors, teach identifying self-affirmations, assess eating behaviors/patterns, teach about Maslow's hierarchy of needs; Motivational Interviewing: open-ended questions, affirmations, reflections, reinforce personal control and choices, challenge sustain talk, evoke change talk, reinforce natural coping skills, explore ambivalence and roadblocks to change, teach OARS and active listening skills; DBT: teach interpersonal effectiveness and dialectical thinking as it relates to relationships (self vs others), reinforce wise mind and dialectical thinking to address black and white thinking, teach active listening for effective communication, reinforce PLEASE skills          ASSESSMENT: Current Emotional / Mental Status (status of significant symptoms):   Risk status (Self / Other harm or suicidal ideation)   Client reports the following current fears or concerns for personal safety: lives in off campus housing.  Client reports the following current or recent suicidal ideation or behaviors: onset - middle school, reports low self esteem, weight issues, and experienced bullying; states SI comes in waves and continued into college - no SI freshman year, but this year SI has worsened - hopelessness for the future, \"it's not worth it, what's the point, you haven't done anything, you won't do anything, accomplish anything\"; denies attempts, hospitalizations, plans; hardik by distracting, hanging out with friends, and getting \"really into something like watching a show\".   Client denies current or recent homicidal ideation or behaviors.  Client reports current or recent self injurious behavior or ideation including onset - high school, superficial cutting, no " scars, last self-harm Sept 2017.  Client denies other safety concerns.  A safety and risk management plan has been developed at this time, see below  Client reports there are no firearms in the house.     Appearance:   Appropriate    Eye Contact:   Good    Psychomotor Behavior: Normal    Attitude:   Cooperative    Orientation:   All   Speech    Rate / Production: Normal     Volume:  Normal    Mood:    Anxious Depressed Stressed   Affect:    Appropriate Mood congruent    Thought Content:  Rumination    Thought Form:  Coherent  Logical  Circumstantial   Insight:    Good     Medication Review:   No current psychiatric medications prescribed     Medication Compliance:   NA     Changes in Health Issues:   None reported     Chemical Use Review:   Substance Use: Chemical use reviewed, no active concerns identified      Tobacco Use: No current tobacco use     Collateral Reports Completed:   Not Applicable      PLAN: (Client Tasks / Therapist Tasks / Other)  Therapist will assign homework of using safety plan; role-play effective communication skills; teach emotional regulation skills. Continue to reinforce interpersonal effectiveness and self-awareness. Continue to reinforce active listening, self-care, and help seeking behaviors. Continue to reinforce self-care and effective communication to reduce negative self-image. Continue to reinforce problem solving skills and effective help seeking behaviors.         Ning Montiel [facility]                                                         ________________________________________________________________________    Treatment Plan    Client's Name: Sivan Mathew  YOB: 2000    Date: 6/5/2018    Diagnoses: 300.02 (F41.1) Generalized Anxiety Disorder & 296.32 (F33.1) Major Depressive Disorder, Recurrent Episode, Moderate; Hx Anorexia Nervosa  Psychosocial & Contextual Factors: School stress, relationship stress  WHODAS: 16    Referral / Collaboration:  Was/were  "discussed and client will pursue - see PCP for medication.    Anticipated number of session or this episode of care: 12      MeasurableTreatment Goal(s) related to diagnosis / functional impairment(s)  Goal 1: Client will improve mood as evidenced by decreased score on PHQ 9 from 10 (moderate) to 5 or less (mild).    I will know I've met my goal when I have fewer intrusive thoughts and more energy.      Objective #A (Client Action)    Client will identify and practice 3 new strategies for dealing with strong emotions as they relate to SI.  Status: New - Date: 6/5/2018     Intervention(s)  Therapist will assign homework of using safety plan; role-play effective communication skills; teach emotional regulation skills.    Objective #B  Client will feel less tired and more energy during the day.  Status: New - Date: 6/5/2018     Intervention(s)  Therapist will assign homework of social leisure planning and routine development; provide educational materials on what is self care; role-play assertiveness skills; teach emotional recognition/identification.     Goal 2: Client will better manage anxiety as evidenced by decreased score on LEYLA 7 from 5 to 0.     I will know I've met my goal when I have ewer anxious tendencies (talking loud, overly giggly and bright).      Objective #A (Client Action)    Client will learn 3 anxiety management strategies.  Status: New - Date: 6/5/2018     Intervention(s)  Therapist will assign homework of skill practice; provide educational materials on relaxation/mindfulness; role-play conflict management; teach the client how to perform a behavioral chain analysis.      Client has reviewed and agreed to the above plan.      Ning Montiel [facility]  June 5, 2018                                                 Sivan Mathew     SAFETY PLAN:  Step 1: Warning signs / cues (Thoughts, images, mood, situation, behavior) that a crisis may be developing:    Thoughts: \"I don't matter\", \"People would be " "better off without me - get on without me\", \"I'm a burden\", \"I can't do this anymore\", \"I wish I was dead\", \"It would benefit everyone if I was dead\", \"I wish I could freeze and take a breath\", \"This is how it's always going to be\", \"You're going to be unhappy in the future - nothing is going to change\"    Images: obsessive thoughts of death or dying - thinking about bridges, how people would react, what would happen to finances and other responsibilities attached to me    Thinking Processes: ruminations (can't stop thinking about my problems), racing thoughts, intrusive thoughts (bothersome, unwanted thoughts that come out of nowhere), highly critical and negative thoughts    Mood: worsening depression and disinhibited (not caring about things or consequences)    Behaviors: isolating/withdrawing , using alcohol, impulsive, reckless behaviors (acting without thinking), not taking care of myself and not taking care of my responsibilities, can't talk to people, can't give direct eye contact, can't process situations     Situations: public shame: over sharing then feeling bad when others react, relationship problems, school stress   Step 2: Coping strategies - Things I can do to take my mind off of my problems without contacting another person (relaxation technique, physical activity):    Distress Tolerance Strategies:  listen to positive and upbeat music, sensory based activities/self-soothe with five senses, watch a funny youtube videos, pray, paced breathing/progressive muscle relaxation, intense exercise for 2-3 minutes, ride light rail      Physical Activities: go for a walk, exercise - running, deep breathing, weight lifting    Focus on helpful thoughts:  \"I will get through this\", \"It always passes\", \"Ride the wave\", think about an affirmation/compliment someone has said to me - \"you're my favorite\"  Step 3: People and social settings that provide distraction:   Name: Wendy (friend) Phone: " [phone]   Name: Ashlee (roommate) Phone: [phone]   Name: Cesario (brother)  Phone: [phone]    Safe places - movie theater, park, Sabianism, work   Step 4: Remind myself of people and things that are important to me and worth living for: family, close friends, coworkers, future relationship/family/dog, freedom and money to travel, helping global warming, having a job that matters  Step 5: When I am in crisis, I can ask these people to help me use my safety plan:   Name: Wendy (friend) Phone: [phone]   Name: Ashlee (roommate) Phone: [phone]   Name: Cesario (brother)  Phone: [phone]  Step 6: Making the environment safe:     remove alcohol  Step 7: Professionals or agencies I can contact during a crisis:    [facility] Daytime and After Hours Crisis Number: 610-101-1349    Suicide Prevention Lifeline: 8-884-894-PAJP (8631)    Crisis Text Line Service (available 24 hours a day, 7 days a week): Text [place] to 151539  Local Crisis Services: [facility] -- [phone]    Call 911 or go to my nearest emergency department.   I helped develop this safety plan and agree to use it when needed.  I have been given a copy of this plan.      Client signature _________________________________________________________________  Today s date:  6/5/2018  Adapted from Safety Plan Template 2008 Lisette Dorman and Joe Mcnally is reprinted with the express permission of the authors.  No portion of the Safety Plan Template may be reproduced without the express, written permission.  You can contact the authors at bhs@[facility] [place] or mimi@mail.[email or web address].

## 2018-10-05 NOTE — MR AVS SNAPSHOT
MRN:[id number]                      After Visit Summary   10/5/2018    Sivan Mathew    MRN: [id number]           Visit Information        Provider Department      10/5/2018 9:00 AM Ning Montiel [facility]      Your next 10 appointments already scheduled     Oct 19, 2018  9:00 AM CDT   Return Visit with Ning Montiel [facility]  [address] [facility]  [facility] [address]   [phone]            Nov 09, 2018  9:00 AM CST   Return Visit with Ning Montiel [facility] ([facility]  [address]  [facility] [id number] [phone]            Nov 13, 2018  9:00 AM CST   SHORT with Daly Rodriges PA-C   [facility] ([address] [phone]            Nov 16, 2018  9:00 AM CST   Return Visit with Ning Montiel [facility] ([facility]  [address]  [facility] [phone]            Nov 30, 2018  8:00 AM CST   Return Visit with Ning Montiel [facility] ([facility]  [address] [facility]  [facility] [phone]              MyChart Information     [employer] gives you secure access to your electronic health record. If you see a primary care provider, you can also send messages to your care team and make appointments. If you have questions, please call your primary care clinic.  If you do not have a primary care provider, please call [phone] and they will assist you.        Care EveryWhere ID     This is your Care EveryWhere ID. This could be used by other organizations to access your [facility] medical records  [id number]         Equal Access to Services     LENNOX VILLALBA : Evelia Man, walopez leonard, qaedel pulliam. So [facility] [phone].    ATENCIÓN: Si habla español, tiene a gonzalez disposición servicios gratuitos de asistencia lingüística. Llame al [phone].    We comply with applicable federal civil rights laws and [place] laws. We do not discriminate on the basis of race, color, national origin, age, disability, sex, sexual orientation, or gender identity.

## 2018-10-06 ASSESSMENT — ANXIETY QUESTIONNAIRES: GAD7 TOTAL SCORE: 13

## 2018-10-06 ASSESSMENT — PATIENT HEALTH QUESTIONNAIRE - PHQ9: SUM OF ALL RESPONSES TO PHQ QUESTIONS 1-9: 19

## 2018-10-19 ENCOUNTER — OFFICE VISIT (OUTPATIENT)
Dept: PSYCHOLOGY | Facility: CLINIC | Age: 18
End: 2018-10-19
Payer: COMMERCIAL

## 2018-10-19 DIAGNOSIS — F41.1 GENERALIZED ANXIETY DISORDER: ICD-10-CM

## 2018-10-19 DIAGNOSIS — F33.1 MAJOR DEPRESSIVE DISORDER, RECURRENT, MODERATE (H): Primary | ICD-10-CM

## 2018-10-19 PROCEDURE — 90834 PSYTX W PT 45 MINUTES: CPT | Performed by: COUNSELOR

## 2018-10-19 ASSESSMENT — ANXIETY QUESTIONNAIRES
IF YOU CHECKED OFF ANY PROBLEMS ON THIS QUESTIONNAIRE, HOW DIFFICULT HAVE THESE PROBLEMS MADE IT FOR YOU TO DO YOUR WORK, TAKE CARE OF THINGS AT HOME, OR GET ALONG WITH OTHER PEOPLE: SOMEWHAT DIFFICULT
GAD7 TOTAL SCORE: 12
1. FEELING NERVOUS, ANXIOUS, OR ON EDGE: MORE THAN HALF THE DAYS
2. NOT BEING ABLE TO STOP OR CONTROL WORRYING: MORE THAN HALF THE DAYS
5. BEING SO RESTLESS THAT IT IS HARD TO SIT STILL: SEVERAL DAYS
3. WORRYING TOO MUCH ABOUT DIFFERENT THINGS: NEARLY EVERY DAY
7. FEELING AFRAID AS IF SOMETHING AWFUL MIGHT HAPPEN: MORE THAN HALF THE DAYS
6. BECOMING EASILY ANNOYED OR IRRITABLE: SEVERAL DAYS

## 2018-10-19 ASSESSMENT — PATIENT HEALTH QUESTIONNAIRE - PHQ9: 5. POOR APPETITE OR OVEREATING: SEVERAL DAYS

## 2018-10-19 NOTE — PROGRESS NOTES
Progress Note    Client Name: Sivan Mathew  Date: 10/19/2018         Service Type: Individual      Session Start Time: 9:00a  Session End Time: 9:45a      Session Length: 45 minutes     Session #: 11     Attendees: Client attended alone    Treatment Plan Last Reviewed: 10/19/2018  PHQ-9 / LEYLA-7 : 14 & 12       DATA      Progress Since Last Session (Related to Symptoms / Goals / Homework):   Symptoms: Improved overall, excessive sleeping, see Epic for PHQ 9 and LEYLA 7 updates     Homework: Partially completed and continued - client will make a decision about schedule change and look for other options for support. Client will also work to connect with disability office and with PCP re: sleep, continue to set limits and work on self-care and school priorities.       Episode of Care Goals: Satisfactory progress - ACTION (Actively working towards change); Intervened by reinforcing change plan / affirming steps taken     Current / Ongoing Stressors and Concerns:   Reported improved mood due to increased studying, dropping some work hours, improving eating habits, and petitioning to reduce internship hours; reported receiving support from roommate and friends and described intentionally stepping away from socialization to focus on school; reported being cautious of roommate's poor eating habits as she does not want that to influence her health; inquired about her childhood and why she has not have strong connection to childhood memories, but roommate and their friends seem to have really fond childhood memories.      Treatment Objective(s) Addressed in This Session:    Client will learn 3 anxiety management strategies. Client will identify and practice 3 new strategies for dealing with strong emotions as they relate to SI. Client will feel less tired and more energy during the day.     Intervention:   CBT: identity and challenge self-defeating and maladaptive thoughts,  "self-talk, and behaviors, teach and reinforce proactive interpersonal communication and problem solving skills, identify emotions and potential underlying reasons/functions of emotions, teach assertiveness skills, reinforce self-awareness and proactive problem solving and help seeking behaviors, teach identifying self-affirmations, assess eating behaviors/patterns, teach about Maslow's hierarchy of needs; Motivational Interviewing: open-ended questions, affirmations, reflections, reinforce personal control and choices, challenge sustain talk, evoke change talk, reinforce natural coping skills, explore ambivalence and roadblocks to change, teach OARS and active listening skills; DBT: teach interpersonal effectiveness and dialectical thinking as it relates to relationships (self vs others), reinforce wise mind and dialectical thinking to address black and white thinking, teach active listening for effective communication, reinforce PLEASE skills          ASSESSMENT: Current Emotional / Mental Status (status of significant symptoms):   Risk status (Self / Other harm or suicidal ideation)   Client reports the following current fears or concerns for personal safety: lives in off campus housing.  Client reports the following current or recent suicidal ideation or behaviors: onset - middle school, reports low self esteem, weight issues, and experienced bullying; states SI comes in waves and continued into college - no SI freshman year, but this year SI has worsened - hopelessness for the future, \"it's not worth it, what's the point, you haven't done anything, you won't do anything, accomplish anything\"; denies attempts, hospitalizations, plans; hardik by distracting, hanging out with friends, and getting \"really into something like watching a show\".   Client denies current or recent homicidal ideation or behaviors.  Client reports current or recent self injurious behavior or ideation including onset - high school, " "superficial cutting, no scars, last self-harm Sept 2017.  Client denies other safety concerns.  A safety and risk management plan has been developed at this time, see below  Client reports there are no firearms in the house.     Appearance:   Appropriate    Eye Contact:   Good    Psychomotor Behavior: Normal    Attitude:   Cooperative    Orientation:   All   Speech    Rate / Production: Normal     Volume:  Normal    Mood:    \"A lot schuyler\"    Affect:    Appropriate Mood congruent    Thought Content:  Less rumination    Thought Form:  Coherent  Logical  Circumstantial   Insight:    Good     Medication Review:   See Epic for updates     Medication Compliance:   Yes     Changes in Health Issues:   None reported     Chemical Use Review:   Substance Use: Chemical use reviewed, no active concerns identified      Tobacco Use: No current tobacco use     Collateral Reports Completed:   Not Applicable      PLAN: (Client Tasks / Therapist Tasks / Other)  Therapist will assign homework of using safety plan; role-play effective communication skills; teach emotional regulation skills. Continue to reinforce interpersonal effectiveness and self-awareness. Continue to reinforce active listening, self-care, and help seeking behaviors. Continue to reinforce self-care and effective communication to reduce negative self-image. Continue to reinforce problem solving skills and effective help seeking behaviors.         Ning Montiel [facility]                                                         ________________________________________________________________________    Treatment Plan    Client's Name: Sivan Mathew  YOB: 2000    Date: 6/5/2018    Diagnoses: 300.02 (F41.1) Generalized Anxiety Disorder & 296.32 (F33.1) Major Depressive Disorder, Recurrent Episode, Moderate; Hx Anorexia Nervosa  Psychosocial & Contextual Factors: School stress, relationship stress  WHODAS: 16    Referral / Collaboration:  Was/were discussed and " "client will pursue - see PCP for medication.    Anticipated number of session or this episode of care: 12      MeasurableTreatment Goal(s) related to diagnosis / functional impairment(s)  Goal 1: Client will improve mood as evidenced by decreased score on PHQ 9 from 10 (moderate) to 5 or less (mild).    I will know I've met my goal when I have fewer intrusive thoughts and more energy.      Objective #A (Client Action)    Client will identify and practice 3 new strategies for dealing with strong emotions as they relate to SI.  Status: New - Date: 6/5/2018     Intervention(s)  Therapist will assign homework of using safety plan; role-play effective communication skills; teach emotional regulation skills.    Objective #B  Client will feel less tired and more energy during the day.  Status: New - Date: 6/5/2018     Intervention(s)  Therapist will assign homework of social leisure planning and routine development; provide educational materials on what is self care; role-play assertiveness skills; teach emotional recognition/identification.     Goal 2: Client will better manage anxiety as evidenced by decreased score on LEYLA 7 from 5 to 0.     I will know I've met my goal when I have ewer anxious tendencies (talking loud, overly giggly and bright).      Objective #A (Client Action)    Client will learn 3 anxiety management strategies.  Status: New - Date: 6/5/2018     Intervention(s)  Therapist will assign homework of skill practice; provide educational materials on relaxation/mindfulness; role-play conflict management; teach the client how to perform a behavioral chain analysis.      Client has reviewed and agreed to the above plan.      Ning Montiel [facility]  June 5, 2018                                                 Sivan Mathew     SAFETY PLAN:  Step 1: Warning signs / cues (Thoughts, images, mood, situation, behavior) that a crisis may be developing:    Thoughts: \"I don't matter\", \"People would be better off " "without me - get on without me\", \"I'm a burden\", \"I can't do this anymore\", \"I wish I was dead\", \"It would benefit everyone if I was dead\", \"I wish I could freeze and take a breath\", \"This is how it's always going to be\", \"You're going to be unhappy in the future - nothing is going to change\"    Images: obsessive thoughts of death or dying - thinking about bridges, how people would react, what would happen to finances and other responsibilities attached to me    Thinking Processes: ruminations (can't stop thinking about my problems), racing thoughts, intrusive thoughts (bothersome, unwanted thoughts that come out of nowhere), highly critical and negative thoughts    Mood: worsening depression and disinhibited (not caring about things or consequences)    Behaviors: isolating/withdrawing , using alcohol, impulsive, reckless behaviors (acting without thinking), not taking care of myself and not taking care of my responsibilities, can't talk to people, can't give direct eye contact, can't process situations     Situations: public shame: over sharing then feeling bad when others react, relationship problems, school stress   Step 2: Coping strategies - Things I can do to take my mind off of my problems without contacting another person (relaxation technique, physical activity):    Distress Tolerance Strategies:  listen to positive and upbeat music, sensory based activities/self-soothe with five senses, watch a funny youtube videos, pray, paced breathing/progressive muscle relaxation, intense exercise for 2-3 minutes, ride light rail      Physical Activities: go for a walk, exercise - running, deep breathing, weight lifting    Focus on helpful thoughts:  \"I will get through this\", \"It always passes\", \"Ride the wave\", think about an affirmation/compliment someone has said to me - \"you're my favorite\"  Step 3: People and social settings that provide distraction:   Name: Wendy (friend) Phone: [phone]   Name: Ashlee " (roommate) Phone: [phone]   Name: Cesario (brother)  Phone: [phone]    Safe places - movie theater, park, [employer], work   Step 4: Remind myself of people and things that are important to me and worth living for: family, close friends, coworkers, future relationship/family/dog, freedom and money to travel, helping global warming, having a job that matters  Step 5: When I am in crisis, I can ask these people to help me use my safety plan:   Name: Wendy (friend) Phone: [phone]   Name: Ashlee (roommate) Phone: [phone]   Name: Cesario (brother)  Phone: [phone]  Step 6: Making the environment safe:     remove alcohol  Step 7: Professionals or agencies I can contact during a crisis:    [facility] Daytime and After Hours Crisis Number: [phone]    Suicide Prevention Lifeline: 1-688-566-TVCV (3870)    Crisis Text Line Service (available 24 hours a day, 7 days a week): Text [place] to 046142  Local Crisis Services: [facility] -- [phone]    Call 911 or go to my nearest emergency department.   I helped develop this safety plan and agree to use it when needed.  I have been given a copy of this plan.      Client signature _________________________________________________________________  Today s date:  6/5/2018  Adapted from Safety Plan Template 2008 Lisette Dorman and Joe Mcnally is reprinted with the express permission of the authors.  No portion of the Safety Plan Template may be reproduced without the express, written permission.  You can contact the authors at [email or web address] or [email or web address].

## 2018-10-19 NOTE — MR AVS SNAPSHOT
MRN:[id number]                      After Visit Summary   10/19/2018    Sivan Mathew    MRN: [id number]           Visit Information        Provider Department      10/19/2018 9:00 AM Ning Montiel [facility] Generic      Your next 10 appointments already scheduled     Nov 09, 2018  9:00 AM CST   Return Visit with Ning Montiel [facility]  [address] [facility]  [employer] [address]   [phone]            Nov 13, 2018  9:00 AM CST   SHORT with Daly Rodriges PA-C   [facility]    [address] [place] [id number] [phone]            Nov 16, 2018  9:00 AM CST   Return Visit with Ning Montiel [facility] ([facility]  [address] [place], [place] [id number]  [employer] [id number] [phone]            Nov 30, 2018  8:00 AM CST   Return Visit with Ning Montiel [facility]  [address] [facility]  [employer] [id number] [phone]              MyChart Information     [employer] gives you secure access to your electronic health record. If you see a primary care provider, you can also send messages to your care team and make appointments. If you have questions, please call your primary care clinic.  If you do not have a primary care provider, please call [phone] and they will assist you.        Care EveryWhere ID     This is your Care EveryWhere ID. This could be used by other organizations to access your [place] medical records  [id number]        Equal Access to Services     LENNOX VILLALBA AH: Evelia Man, jada leonard, carter sapp, edel andrews So [facility] [phone].    ATENCIÓN: Si daphne guillermo  a gonzalez disposición servicios gratuitos de asistencia lingüística. Llame al [phone].    We comply with applicable federal civil rights laws and [place] laws. We do not discriminate on the basis of race, color, national origin, age, disability, sex, sexual orientation, or gender identity.

## 2018-10-20 ASSESSMENT — PATIENT HEALTH QUESTIONNAIRE - PHQ9: SUM OF ALL RESPONSES TO PHQ QUESTIONS 1-9: 14

## 2018-10-20 ASSESSMENT — ANXIETY QUESTIONNAIRES: GAD7 TOTAL SCORE: 12

## 2018-11-16 ENCOUNTER — OFFICE VISIT (OUTPATIENT)
Dept: PSYCHOLOGY | Facility: CLINIC | Age: 18
End: 2018-11-16
Payer: COMMERCIAL

## 2018-11-16 DIAGNOSIS — F33.1 MAJOR DEPRESSIVE DISORDER, RECURRENT, MODERATE (H): ICD-10-CM

## 2018-11-16 DIAGNOSIS — F41.1 GENERALIZED ANXIETY DISORDER: Primary | ICD-10-CM

## 2018-11-16 PROCEDURE — 90834 PSYTX W PT 45 MINUTES: CPT | Performed by: COUNSELOR

## 2018-11-16 ASSESSMENT — ANXIETY QUESTIONNAIRES
3. WORRYING TOO MUCH ABOUT DIFFERENT THINGS: MORE THAN HALF THE DAYS
1. FEELING NERVOUS, ANXIOUS, OR ON EDGE: MORE THAN HALF THE DAYS
2. NOT BEING ABLE TO STOP OR CONTROL WORRYING: MORE THAN HALF THE DAYS
GAD7 TOTAL SCORE: 10
5. BEING SO RESTLESS THAT IT IS HARD TO SIT STILL: MORE THAN HALF THE DAYS
IF YOU CHECKED OFF ANY PROBLEMS ON THIS QUESTIONNAIRE, HOW DIFFICULT HAVE THESE PROBLEMS MADE IT FOR YOU TO DO YOUR WORK, TAKE CARE OF THINGS AT HOME, OR GET ALONG WITH OTHER PEOPLE: SOMEWHAT DIFFICULT
7. FEELING AFRAID AS IF SOMETHING AWFUL MIGHT HAPPEN: NOT AT ALL
6. BECOMING EASILY ANNOYED OR IRRITABLE: SEVERAL DAYS

## 2018-11-16 ASSESSMENT — PATIENT HEALTH QUESTIONNAIRE - PHQ9
5. POOR APPETITE OR OVEREATING: SEVERAL DAYS
SUM OF ALL RESPONSES TO PHQ QUESTIONS 1-9: 15

## 2018-11-16 NOTE — MR AVS SNAPSHOT
MRN:[id number]                      After Visit Summary   11/16/2018    Sivan Mathew    MRN: [id number]           Visit Information        Provider Department      11/16/2018 9:00 AM Ning Montiel [facility] Generic      Your next 10 appointments already scheduled     Nov 28, 2018  8:20 AM CST   SHORT with Daly Rodriges PA-C   [facility]    [address] [place] [id number]   [phone]            Nov 30, 2018  8:00 AM CST   Return Visit with Ning Montiel [facility]    [facility]  [address]  [facility] [id number] [phone]            Dec 14, 2018  9:00 AM CST   Return Visit with Ning Montiel [facility] ([facility])    [facility]  [address]  [facility] [id number] [phone]            Jan 04, 2019  9:00 AM CST   Return Visit with Ning Montiel [facility] ([facility])    [facility]  [address]  [facility] [id number] [phone]              MyChart Information     [employer] gives you secure access to your electronic health record. If you see a primary care provider, you can also send messages to your care team and make appointments. If you have questions, please call your primary care clinic.  If you do not have a primary care provider, please call [phone] and they will assist you.        Care EveryWhere ID     This is your Care EveryWhere ID. This could be used by other organizations to access your [place] medical records  [id number]        Equal Access to Services     LENNOX VILLALBA AH: Evelia Man, jada leonard, carter sapp, edel andrews So [facility] [phone].    ATENCIÓN: Si daphne guillermo  a gonzalez disposición servicios gratuitos de asistencia lingüística. Llame al [phone].    We comply with applicable federal civil rights laws and [place] laws. We do not discriminate on the basis of race, color, national origin, age, disability, sex, sexual orientation, or gender identity.

## 2018-11-16 NOTE — PROGRESS NOTES
Progress Note    Client Name: Sivan Mathew  Date: 11/16/2018         Service Type: Individual      Session Start Time: 9:00a  Session End Time: 9:45a      Session Length: 45 minutes     Session #: 12     Attendees: Client attended alone    Treatment Plan Last Reviewed: 11/16/2018  PHQ-9 / LEYLA-7 : 15 & 10       DATA      Progress Since Last Session (Related to Symptoms / Goals / Homework):   Symptoms: Stable, continued excessive sleeping, see Epic for PHQ 9 and LEYLA 7 updates     Homework: Partially completed and continued - client will also work to follow up with disability office and with PCP, continue to set limits friends and work on self-care and school priorities.       Episode of Care Goals: Satisfactory progress - ACTION (Actively working towards change); Intervened by reinforcing change plan / affirming steps taken     Current / Ongoing Stressors and Concerns:   Continued stable mood and anxiety - stated she is doing well in school, at work, and with healthy eating; continued difficulties with friend and roommate struggling with mental health issues - was able to identify a plan for collaborating with roommate on meals and communicating feelings with friend.     Treatment Objective(s) Addressed in This Session:    Client will learn 3 anxiety management strategies. Client will identify and practice 3 new strategies for dealing with strong emotions as they relate to SI. Client will feel less tired and more energy during the day.     Intervention:   CBT: identity and challenge self-defeating and maladaptive thoughts, self-talk, and behaviors, teach and reinforce proactive interpersonal communication and problem solving skills, identify emotions and potential underlying reasons/functions of emotions, teach assertiveness skills, reinforce self-awareness and proactive problem solving and help seeking behaviors, teach identifying self-affirmations, assess eating  "behaviors/patterns, teach about Maslow's hierarchy of needs; Motivational Interviewing: open-ended questions, affirmations, reflections, reinforce personal control and choices, challenge sustain talk, evoke change talk, reinforce natural coping skills, explore ambivalence and roadblocks to change, teach OARS and active listening skills; DBT: teach interpersonal effectiveness and dialectical thinking as it relates to relationships (self vs others), reinforce wise mind and dialectical thinking to address black and white thinking, teach active listening for effective communication, reinforce PLEASE skills          ASSESSMENT: Current Emotional / Mental Status (status of significant symptoms):   Risk status (Self / Other harm or suicidal ideation)   Client reports the following current fears or concerns for personal safety: lives in off campus housing.  Client reports the following current or recent suicidal ideation or behaviors: onset - middle school, reports low self esteem, weight issues, and experienced bullying; states SI comes in waves and continued into college - no SI freshman year, but this year SI has worsened - hopelessness for the future, \"it's not worth it, what's the point, you haven't done anything, you won't do anything, accomplish anything\"; denies attempts, hospitalizations, plans; hardik by distracting, hanging out with friends, and getting \"really into something like watching a show\".   Client denies current or recent homicidal ideation or behaviors.  Client reports current or recent self injurious behavior or ideation including onset - high school, superficial cutting, no scars, last self-harm Sept 2017.  Client denies other safety concerns.  A safety and risk management plan has been developed at this time, see below  Client reports there are no firearms in the house.     Appearance:   Appropriate    Eye Contact:   Good    Psychomotor Behavior: Normal    Attitude:   Cooperative "    Orientation:   All   Speech    Rate / Production: Normal     Volume:  Normal    Mood:    Normal    Affect:    Appropriate Mood congruent    Thought Content:  Clear   Thought Form:  Coherent  Logical  Circumstantial   Insight:    Good     Medication Review:   See Epic for updates     Medication Compliance:   Yes     Changes in Health Issues:   None reported     Chemical Use Review:   Substance Use: Chemical use reviewed, no active concerns identified      Tobacco Use: No current tobacco use     Collateral Reports Completed:   Not Applicable      PLAN: (Client Tasks / Therapist Tasks / Other)  Therapist will assign homework of using safety plan; role-play effective communication skills; teach emotional regulation skills. Continue to reinforce interpersonal effectiveness and self-awareness. Continue to reinforce active listening, self-care, and help seeking behaviors. Continue to reinforce self-care and effective communication to reduce negative self-image. Continue to reinforce problem solving skills and effective help seeking behaviors.         Ning Montiel [facility]                                                         ________________________________________________________________________    Treatment Plan    Client's Name: Sivan Mathew  YOB: 2000    Date: 11/16/2018    Diagnoses: 300.02 (F41.1) Generalized Anxiety Disorder & 296.32 (F33.1) Major Depressive Disorder, Recurrent Episode, Moderate; Hx Anorexia Nervosa  Psychosocial & Contextual Factors: School stress, relationship stress  WHODAS: 16    Referral / Collaboration:  Was/were discussed and client will pursue - see PCP for medication.    Anticipated number of session or this episode of care: 12      MeasurableTreatment Goal(s) related to diagnosis / functional impairment(s)  Goal 1: Client will improve mood as evidenced by decreased score on PHQ 9 from 10 (moderate) to 5 or less (mild).    I will know I've met my goal when I have fewer  "intrusive thoughts and more energy.      Objective #A (Client Action)    Client will identify and practice 3 new strategies for dealing with strong emotions as they relate to SI.  Status: Continued - Date: 11/16/2018    Intervention(s)  Therapist will assign homework of using safety plan; role-play effective communication skills; teach emotional regulation skills.    Objective #B  Client will feel less tired and more energy during the day.  Status: Continued - Date: 11/16/2018    Intervention(s)  Therapist will assign homework of social leisure planning and routine development; provide educational materials on what is self care; role-play assertiveness skills; teach emotional recognition/identification.     Goal 2: Client will better manage anxiety as evidenced by decreased score on LEYLA 7 from 5 to 0.     I will know I've met my goal when I have ewer anxious tendencies (talking loud, overly giggly and bright).      Objective #A (Client Action)    Client will learn 3 anxiety management strategies.  Status: Continued - Date: 11/16/2018    Intervention(s)  Therapist will assign homework of skill practice; provide educational materials on relaxation/mindfulness; role-play conflict management; teach the client how to perform a behavioral chain analysis.      Client has reviewed and agreed to the above plan.      Ning Montiel, [facility]  11/16/2018                                                 Sivan Mathew     SAFETY PLAN:  Step 1: Warning signs / cues (Thoughts, images, mood, situation, behavior) that a crisis may be developing:    Thoughts: \"I don't matter\", \"People would be better off without me - get on without me\", \"I'm a burden\", \"I can't do this anymore\", \"I wish I was dead\", \"It would benefit everyone if I was dead\", \"I wish I could freeze and take a breath\", \"This is how it's always going to be\", \"You're going to be unhappy in the future - nothing is going to change\"    Images: obsessive thoughts of death or " "dying - thinking about bridges, how people would react, what would happen to finances and other responsibilities attached to me    Thinking Processes: ruminations (can't stop thinking about my problems), racing thoughts, intrusive thoughts (bothersome, unwanted thoughts that come out of nowhere), highly critical and negative thoughts    Mood: worsening depression and disinhibited (not caring about things or consequences)    Behaviors: isolating/withdrawing , using alcohol, impulsive, reckless behaviors (acting without thinking), not taking care of myself and not taking care of my responsibilities, can't talk to people, can't give direct eye contact, can't process situations     Situations: public shame: over sharing then feeling bad when others react, relationship problems, school stress   Step 2: Coping strategies - Things I can do to take my mind off of my problems without contacting another person (relaxation technique, physical activity):    Distress Tolerance Strategies:  listen to positive and upbeat music, sensory based activities/self-soothe with five senses, watch a funny youtube videos, pray, paced breathing/progressive muscle relaxation, intense exercise for 2-3 minutes, ride light rail      Physical Activities: go for a walk, exercise - running, deep breathing, weight lifting    Focus on helpful thoughts:  \"I will get through this\", \"It always passes\", \"Ride the wave\", think about an affirmation/compliment someone has said to me - \"you're my favorite\"  Step 3: People and social settings that provide distraction:   Name: Wendy (friend) Phone: [phone]   Name: Ashlee (roommate) Phone: [phone]   Name: Cesario (brother)  Phone: [phone]    Safe places - movie theater, park, Zoroastrianism, work   Step 4: Remind myself of people and things that are important to me and worth living for: family, close friends, coworkers, future relationship/family/dog, freedom and money to travel, helping global warming, " having a job that matters  Step 5: When I am in crisis, I can ask these people to help me use my safety plan:   Name: Wendy (friend) Phone: [phone]   Name: Ashlee (roommate) Phone: [phone]   Name: Cesario (brother)  Phone: [phone]  Step 6: Making the environment safe:     remove alcohol  Step 7: Professionals or agencies I can contact during a crisis:    [facility] Daytime and After Hours Crisis Number: [phone]    Suicide Prevention Lifeline: 6-537-401-XPLX (1300)    Crisis Text Line Service (available 24 hours a day, 7 days a week): Text [place] to 236917  Local Crisis Services: [facility]- [phone]    Call 911 or go to my nearest emergency department.   I helped develop this safety plan and agree to use it when needed.  I have been given a copy of this plan.      Client signature _________________________________________________________________  Today s date:  6/5/2018  Adapted from Safety Plan Template 2008 Lisette Dorman and Joe Mcnally is reprinted with the express permission of the authors.  No portion of the Safety Plan Template may be reproduced without the express, written permission.  You can contact the authors at [email or web address] or [email or web address].

## 2018-11-17 ASSESSMENT — ANXIETY QUESTIONNAIRES: GAD7 TOTAL SCORE: 10

## 2018-11-28 ENCOUNTER — OFFICE VISIT (OUTPATIENT)
Dept: FAMILY MEDICINE | Facility: CLINIC | Age: 18
End: 2018-11-28
Payer: COMMERCIAL

## 2018-11-28 VITALS
TEMPERATURE: 99.1 F | DIASTOLIC BLOOD PRESSURE: 72 MMHG | HEART RATE: 80 BPM | SYSTOLIC BLOOD PRESSURE: 124 MMHG | WEIGHT: 223 LBS | BODY MASS INDEX: 35 KG/M2 | HEIGHT: 67 IN

## 2018-11-28 DIAGNOSIS — F33.1 MAJOR DEPRESSIVE DISORDER, RECURRENT, MODERATE (H): Primary | ICD-10-CM

## 2018-11-28 DIAGNOSIS — F41.1 GENERALIZED ANXIETY DISORDER: ICD-10-CM

## 2018-11-28 PROCEDURE — 99213 OFFICE O/P EST LOW 20 MIN: CPT | Performed by: PHYSICIAN ASSISTANT

## 2018-11-28 RX ORDER — FLUOXETINE 40 MG/1
40 CAPSULE ORAL DAILY
Qty: 30 CAPSULE | Refills: 1 | Status: SHIPPED | OUTPATIENT
Start: 2018-11-28 | End: 2019-01-06

## 2018-11-28 ASSESSMENT — ANXIETY QUESTIONNAIRES
7. FEELING AFRAID AS IF SOMETHING AWFUL MIGHT HAPPEN: NOT AT ALL
2. NOT BEING ABLE TO STOP OR CONTROL WORRYING: SEVERAL DAYS
GAD7 TOTAL SCORE: 12
IF YOU CHECKED OFF ANY PROBLEMS ON THIS QUESTIONNAIRE, HOW DIFFICULT HAVE THESE PROBLEMS MADE IT FOR YOU TO DO YOUR WORK, TAKE CARE OF THINGS AT HOME, OR GET ALONG WITH OTHER PEOPLE: SOMEWHAT DIFFICULT
1. FEELING NERVOUS, ANXIOUS, OR ON EDGE: MORE THAN HALF THE DAYS
5. BEING SO RESTLESS THAT IT IS HARD TO SIT STILL: NEARLY EVERY DAY
3. WORRYING TOO MUCH ABOUT DIFFERENT THINGS: MORE THAN HALF THE DAYS
6. BECOMING EASILY ANNOYED OR IRRITABLE: MORE THAN HALF THE DAYS

## 2018-11-28 ASSESSMENT — PATIENT HEALTH QUESTIONNAIRE - PHQ9
5. POOR APPETITE OR OVEREATING: MORE THAN HALF THE DAYS
SUM OF ALL RESPONSES TO PHQ QUESTIONS 1-9: 17

## 2018-11-28 NOTE — PROGRESS NOTES
SUBJECTIVE:   Sivan Mathew is a 18 year old female who presents to clinic today for the following health issues:      Depression and Anxiety Follow-Up    Status since last visit: No change    Other associated symptoms:decreased appetite and sleeping more    Complicating factors:     Side effects of shaking have evened out and gotten better.    Significant life event: No     Current substance abuse: None    Overall, feels things are going really well since increased to 40 mg per day.  Is getting good sleep.  2 internships, library job and full time student so majority of symptoms are related to fatigue.  Denies any suicidal ideations.  Has final exams over the next few weeks, will be very busy.    PHQ 10/5/2018 10/19/2018 11/16/2018   PHQ-9 Total Score 19 14 15   Q9: Suicide Ideation Several days Not at all Not at all     LEYLA-7 SCORE 10/5/2018 10/19/2018 11/16/2018   Total Score - - -   Total Score 13 12 10     In the past two weeks have you had thoughts of suicide or self-harm?  No.    Do you have concerns about your personal safety or the safety of others?   No  PHQ-9  English  PHQ-9   Any Language  LEYLA-7  Suicide Assessment Five-step Evaluation and Treatment (SAFE-T)    Amount of exercise or physical activity: None    Problems taking medications regularly: No    Medication side effects: hands have been shaky (getting better), more drowsy, dry mouth    Diet: regular (no restrictions)    -------------------------------------    Problem list and histories reviewed & adjusted, as indicated.  Additional history: as documented    Reviewed and updated as needed this visit by clinical staff  Tobacco  Allergies  Meds  Med Hx  Surg Hx  Fam Hx  Soc Hx      Reviewed and updated as needed this visit by Provider         ROS:  Constitutional, HEENT, cardiovascular, pulmonary, gi and gu systems are negative, except as otherwise noted.    OBJECTIVE:     /72 (Cuff Size: Adult Large)  Pulse 80  Temp 99.1  F  "(37.3  C) (Oral)  Ht 5' 7\" (1.702 m)  Wt 223 lb (101.2 kg)  BMI 34.93 kg/m2  Body mass index is 34.93 kg/(m^2).  GENERAL: healthy, alert and no distress  NECK: no adenopathy, no asymmetry, masses, or scars and thyroid normal to palpation  RESP: lungs clear to auscultation - no rales, rhonchi or wheezes  CV: regular rate and rhythm, normal S1 S2, no S3 or S4, no murmur, click or rub, no peripheral edema and peripheral pulses strong  PSYCH: mentation appears normal, affect normal/bright    Diagnostic Test Results:  none     ASSESSMENT/PLAN:       ICD-10-CM    1. Major depressive disorder, recurrent, moderate (H) F33.1 FLUoxetine (PROZAC) 40 MG capsule   2. Generalized anxiety disorder F41.1 FLUoxetine (PROZAC) 40 MG capsule     Improved, but not optimally controlled  Will continue on 40 mg for now as don't recommend adjustment during next month given final exams, work, etc.  Nurse to call in 1 month to repeat PHQ-9 and LEYLA-7.  If still elevated, will recommend increase to 60 mg per day.    Daly Rodriges PA-C    Patient Instructions   Continue on current dose for now  Nurse to call beginning of January for follow up.  Consider increase to 60 mg at that time, just don't want to adjust during high stress times.    CLARK Ward PA-C  [facility]  "

## 2018-11-28 NOTE — PATIENT INSTRUCTIONS
Continue on current dose for now  Nurse to call beginning of January for follow up.  Consider increase to 60 mg at that time, just don't want to adjust during high stress times.    Daly Rodriges PA-C

## 2018-11-28 NOTE — MR AVS SNAPSHOT
After Visit Summary   11/28/2018    Sivan Mathew    MRN: [id number]           Patient Information     Date Of Birth          2000        Visit Information        Provider Department      11/28/2018 8:20 AM Daly Rodriges PA-C [facility]        Today's Diagnoses     Major depressive disorder, recurrent, moderate (H)    -  1    Generalized anxiety disorder          Care Instructions    Continue on current dose for now  Nurse to call beginning of January for follow up.  Consider increase to 60 mg at that time, just don't want to adjust during high stress times.    Daly Rodriges PA-C            Follow-ups after your visit        Your next 10 appointments already scheduled     Nov 30, 2018  8:00 AM CST   Return Visit with Ning Montiel [place] [address] [place] [id number] [phone]            Dec 14, 2018  9:00 AM CST   Return Visit with Ning Montiel [place] [address] [place] [phone]            Jan 04, 2019  9:00 AM CST   Return Visit with Ning Montiel [facility] ([facility]  [address] [place] [id number] [phone]              Who to contact     If you have questions or need follow up information about today's clinic visit or your schedule please contact [facility] directly at [phone].  Normal or non-critical lab and imaging results will be communicated to you by MyChart, letter or phone within 4 business days after the clinic has received the results. If you do not hear from us within 7 days, please contact the clinic through MyChart or phone. If you have a critical or abnormal lab result, we will notify you by phone as soon as  "possible.  Submit refill requests through [facility] or call your pharmacy and they will forward the refill request to us. Please allow 3 business days for your refill to be completed.          Additional Information About Your Visit        [employer] Information     [employer] gives you secure access to your electronic health record. If you see a primary care provider, you can also send messages to your care team and make appointments. If you have questions, please call your primary care clinic.  If you do not have a primary care provider, please call [phone] and they will assist you.        Care EveryWhere ID     This is your Care EveryWhere ID. This could be used by other organizations to access your [place] medical records  [id number]        Your Vitals Were     Pulse Temperature Height BMI (Body Mass Index)          80 99.1  F (37.3  C) (Oral) 5' 7\" (1.702 m) 34.93 kg/m2         Blood Pressure from Last 3 Encounters:   11/28/18 124/72   09/12/18 130/76   01/15/18 129/79    Weight from Last 3 Encounters:   11/28/18 223 lb (101.2 kg)   09/12/18 223 lb 4 oz (101.3 kg)   01/15/18 224 lb (101.6 kg)              Today, you had the following     No orders found for display         Today's Medication Changes          These changes are accurate as of 11/28/18  9:07 AM.  If you have any questions, ask your nurse or doctor.               These medicines have changed or have updated prescriptions.        Dose/Directions    FLUoxetine 40 MG capsule   Commonly known as:  PROzac   This may have changed:  Another medication with the same name was removed. Continue taking this medication, and follow the directions you see here.   Used for:  Major depressive disorder, recurrent, moderate (H), Generalized anxiety disorder   Changed by:  Daly Rodriges PA-C        Dose:  40 mg   Take 1 capsule (40 mg) by mouth daily   Quantity:  30 capsule   Refills:  1            Where to get your medicines      These medications were sent " to [id number] - [place], [place] - [address], [place] [address]     Phone:  [phone]     FLUoxetine 40 MG capsule                Primary Care Provider Office Phone # Fax #    Daly Caridad Rodriges PA-C [phone] [address] [facility] [address]        Equal Access to Services     LENNOX VILLALBA : Hadii aad ku hadasho Soomaali, waaxda luqadaha, qaybta kaalmada adeegyada, waxay idiin hayaan adeeg kharash la'jesús arroyo. So [facility] [phone].    ATENCIÓN: Si habla español, tiene a gonzalez disposición servicios gratuitos de asistencia lingüística. [facility] [phone].    We comply with applicable federal civil rights laws and [place] laws. We do not discriminate on the basis of race, color, national origin, age, disability, sex, sexual orientation, or gender identity.            Thank you!     Thank you for choosing [facility]  for your care. Our goal is always to provide you with excellent care. Hearing back from our patients is one way we can continue to improve our services. Please take a few minutes to complete the written survey that you may receive in the mail after your visit with us. Thank you!             Your Updated Medication List - Protect others around you: Learn how to safely use, store and throw away your medicines at www.disposemymeds.org.          This list is accurate as of 11/28/18  9:07 AM.  Always use your most recent med list.                   Brand Name Dispense Instructions for use Diagnosis    FLUoxetine 40 MG capsule    PROzac    30 capsule    Take 1 capsule (40 mg) by mouth daily    Major depressive disorder, recurrent, moderate (H), Generalized anxiety disorder       levonorgestrel 20 MCG/24HR IUD    MIRENA    1 each    1 each (20 mcg) by Intrauterine route once for 1 dose    Encounter for IUD insertion

## 2018-11-29 ASSESSMENT — ANXIETY QUESTIONNAIRES: GAD7 TOTAL SCORE: 12

## 2018-11-30 ENCOUNTER — OFFICE VISIT (OUTPATIENT)
Dept: PSYCHOLOGY | Facility: CLINIC | Age: 18
End: 2018-11-30
Payer: COMMERCIAL

## 2018-11-30 DIAGNOSIS — F33.1 MAJOR DEPRESSIVE DISORDER, RECURRENT, MODERATE (H): Primary | ICD-10-CM

## 2018-11-30 DIAGNOSIS — F41.1 GENERALIZED ANXIETY DISORDER: ICD-10-CM

## 2018-11-30 PROCEDURE — 90834 PSYTX W PT 45 MINUTES: CPT | Performed by: COUNSELOR

## 2018-11-30 ASSESSMENT — ANXIETY QUESTIONNAIRES
IF YOU CHECKED OFF ANY PROBLEMS ON THIS QUESTIONNAIRE, HOW DIFFICULT HAVE THESE PROBLEMS MADE IT FOR YOU TO DO YOUR WORK, TAKE CARE OF THINGS AT HOME, OR GET ALONG WITH OTHER PEOPLE: SOMEWHAT DIFFICULT
6. BECOMING EASILY ANNOYED OR IRRITABLE: NEARLY EVERY DAY
7. FEELING AFRAID AS IF SOMETHING AWFUL MIGHT HAPPEN: SEVERAL DAYS
5. BEING SO RESTLESS THAT IT IS HARD TO SIT STILL: NEARLY EVERY DAY
3. WORRYING TOO MUCH ABOUT DIFFERENT THINGS: SEVERAL DAYS
2. NOT BEING ABLE TO STOP OR CONTROL WORRYING: MORE THAN HALF THE DAYS
GAD7 TOTAL SCORE: 12
1. FEELING NERVOUS, ANXIOUS, OR ON EDGE: SEVERAL DAYS

## 2018-11-30 ASSESSMENT — PATIENT HEALTH QUESTIONNAIRE - PHQ9
5. POOR APPETITE OR OVEREATING: SEVERAL DAYS
SUM OF ALL RESPONSES TO PHQ QUESTIONS 1-9: 16

## 2018-11-30 NOTE — MR AVS SNAPSHOT
MRN:[id number]                      After Visit Summary   11/30/2018    Sivan Mathew    MRN: [id number]           Visit Information        Provider Department      11/30/2018 8:00 AM Ning Montiel [facility]      Your next 10 appointments already scheduled     Dec 14, 2018  9:00 AM CST   Return Visit with Ning Montiel [facility] ([facility])    [facility]  [address] [facility]  [facility] [id number] [phone]            Jan 04, 2019  9:00 AM CST   Return Visit with Ning Montiel [facility] ([facility]  [address]  [facility] [phone]              MyChart Information     [facility] gives you secure access to your electronic health record. If you see a primary care provider, you can also send messages to your care team and make appointments. If you have questions, please call your primary care clinic.  If you do not have a primary care provider, please call [phone] and they will assist you.        Care EveryWhere ID     This is your Care EveryWhere ID. This could be used by other organizations to access your [place] medical records  [id number]        Equal Access to Services     LENNOX VILLALBA : vEelia granadoo Magda, waaxda luqadaha, qaybta kaalmada adealanyada, edel arroyo. So [facility] [phone].    ATENCIÓN: Si habla español, tiene a gonzalez disposición servicios gratuitos de asistencia lingüística. [place] [facility] [phone].    We comply with applicable federal civil rights laws and [place] laws. We do not discriminate on the basis of race, color, national origin, age, disability, sex, sexual orientation, or gender identity.

## 2018-11-30 NOTE — PROGRESS NOTES
Progress Note    Client Name: Sivan Mathew  Date: 11/30/2018         Service Type: Individual      Session Start Time: 8:00a  Session End Time: 8:45a      Session Length: 45 minutes     Session #: 13     Attendees: Client attended alone    Treatment Plan Last Reviewed: 11/30/2018  PHQ-9 / LEYLA-7 : 16 & 12       DATA      Progress Since Last Session (Related to Symptoms / Goals / Homework):   Symptoms: Somewhat worsened, sleep has improved some, still experiencing low energy/fatigue everyday, see Epic for PHQ 9 and LEYLA 7 updates     Homework: Completed and continued - client will also work to follow up with disability office and with PCP, continue to set limits friends and work on self-care and school priorities. By next appt, client will work to challenge negative thinking with more neutral thoughts and engage in behavioral changes to improve mood.      Episode of Care Goals: Satisfactory progress - ACTION (Actively working towards change); Intervened by reinforcing change plan / affirming steps taken     Current / Ongoing Stressors and Concerns:   Somewhat worsened mood and anxiety due to passive SI and decreased self esteem; was skillful as she identified not feeling depressed about school/work, but rather depressed internally in how she perceives and thinks about herself; was also skillful as she described coping with SI by spending time to assess why she was thinking the way she was and coming up with affirmations and action plans to address depression and low self esteem; identified needing to challenging how she thinks and engaging with supportive friends.      Treatment Objective(s) Addressed in This Session:    Client will learn 3 anxiety management strategies. Client will identify and practice 3 new strategies for dealing with strong emotions as they relate to SI. Client will feel less tired and more energy during the day.     Intervention:   CBT: identity and  "challenge self-defeating and maladaptive thoughts, self-talk, and behaviors, teach and reinforce proactive interpersonal communication and problem solving skills, identify emotions and potential underlying reasons/functions of emotions, teach assertiveness skills, reinforce self-awareness and proactive problem solving and help seeking behaviors, teach identifying self-affirmations, assess eating behaviors/patterns, teach about Maslow's hierarchy of needs; Motivational Interviewing: open-ended questions, affirmations, reflections, reinforce personal control and choices, challenge sustain talk, evoke change talk, reinforce natural coping skills, explore ambivalence and roadblocks to change, teach OARS and active listening skills; DBT: teach interpersonal effectiveness and dialectical thinking as it relates to relationships (self vs others), reinforce wise mind and dialectical thinking to address black and white thinking, teach active listening for effective communication, reinforce PLEASE skills, reinforce behavior chain analysis for cognitive behavioral changes          ASSESSMENT: Current Emotional / Mental Status (status of significant symptoms):   Risk status (Self / Other harm or suicidal ideation)   Client reports the following current fears or concerns for personal safety: lives in off campus housing.  Client reports the following current or recent suicidal ideation or behaviors: onset - middle school, reports low self esteem, weight issues, and experienced bullying; states SI comes in waves and continued into college - no SI freshman year, but this year SI has worsened - hopelessness for the future, \"it's not worth it, what's the point, you haven't done anything, you won't do anything, accomplish anything\"; denies attempts, hospitalizations, plans; hardik by distracting, hanging out with friends, and getting \"really into something like watching a show\".   Client denies current or recent homicidal ideation or " "behaviors.  Client reports current or recent self injurious behavior or ideation including onset - high school, superficial cutting, no scars, last self-harm Sept 2017.  Client denies other safety concerns.  A safety and risk management plan has been developed at this time, see below  Client reports there are no firearms in the house.     Appearance:   Appropriate    Eye Contact:   Good    Psychomotor Behavior: Normal    Attitude:   Cooperative    Orientation:   All   Speech    Rate / Production: Normal     Volume:  Normal    Mood:    \"Not good\" Depressed Anxious   Affect:    Appropriate Mood congruent    Thought Content:  Clear   Thought Form:  Coherent  Logical  Goal oriented    Insight:    Good     Medication Review:   See Epic for updates     Medication Compliance:   Yes     Changes in Health Issues:   None reported     Chemical Use Review:   Substance Use: Chemical use reviewed, no active concerns identified      Tobacco Use: No current tobacco use     Collateral Reports Completed:   Not Applicable      PLAN: (Client Tasks / Therapist Tasks / Other)  Therapist will assign homework of using safety plan; role-play effective communication skills; teach emotional regulation skills. Continue to reinforce interpersonal effectiveness and self-awareness. Continue to reinforce active listening, self-care, and help seeking behaviors. Continue to reinforce self-care and effective communication to reduce negative self-image. Continue to reinforce problem solving skills and effective help seeking behaviors.         Ning Montiel [facility]                                                         ________________________________________________________________________    Treatment Plan    Client's Name: Sivan Mathew  YOB: 2000    Date: 11/16/2018    Diagnoses: 300.02 (F41.1) Generalized Anxiety Disorder & 296.32 (F33.1) Major Depressive Disorder, Recurrent Episode, Moderate; Hx Anorexia Nervosa  Psychosocial " & Contextual Factors: School stress, relationship stress  WHODAS: 16    Referral / Collaboration:  Was/were discussed and client will pursue - see PCP for medication.    Anticipated number of session or this episode of care: 12      MeasurableTreatment Goal(s) related to diagnosis / functional impairment(s)  Goal 1: Client will improve mood as evidenced by decreased score on PHQ 9 from 10 (moderate) to 5 or less (mild).    I will know I've met my goal when I have fewer intrusive thoughts and more energy.      Objective #A (Client Action)    Client will identify and practice 3 new strategies for dealing with strong emotions as they relate to SI.  Status: Continued - Date: 11/16/2018    Intervention(s)  Therapist will assign homework of using safety plan; role-play effective communication skills; teach emotional regulation skills.    Objective #B  Client will feel less tired and more energy during the day.  Status: Continued - Date: 11/16/2018    Intervention(s)  Therapist will assign homework of social leisure planning and routine development; provide educational materials on what is self care; role-play assertiveness skills; teach emotional recognition/identification.     Goal 2: Client will better manage anxiety as evidenced by decreased score on LEYLA 7 from 5 to 0.     I will know I've met my goal when I have ewer anxious tendencies (talking loud, overly giggly and bright).      Objective #A (Client Action)    Client will learn 3 anxiety management strategies.  Status: Continued - Date: 11/16/2018    Intervention(s)  Therapist will assign homework of skill practice; provide educational materials on relaxation/mindfulness; role-play conflict management; teach the client how to perform a behavioral chain analysis.      Client has reviewed and agreed to the above plan.      Ning Montiel [facility]  11/16/2018                                                 Sivan Mathew     SAFETY PLAN:  Step 1: Warning signs / cues  "(Thoughts, images, mood, situation, behavior) that a crisis may be developing:    Thoughts: \"I don't matter\", \"People would be better off without me - get on without me\", \"I'm a burden\", \"I can't do this anymore\", \"I wish I was dead\", \"It would benefit everyone if I was dead\", \"I wish I could freeze and take a breath\", \"This is how it's always going to be\", \"You're going to be unhappy in the future - nothing is going to change\"    Images: obsessive thoughts of death or dying - thinking about bridges, how people would react, what would happen to finances and other responsibilities attached to me    Thinking Processes: ruminations (can't stop thinking about my problems), racing thoughts, intrusive thoughts (bothersome, unwanted thoughts that come out of nowhere), highly critical and negative thoughts    Mood: worsening depression and disinhibited (not caring about things or consequences)    Behaviors: isolating/withdrawing , using alcohol, impulsive, reckless behaviors (acting without thinking), not taking care of myself and not taking care of my responsibilities, can't talk to people, can't give direct eye contact, can't process situations     Situations: public shame: over sharing then feeling bad when others react, relationship problems, school stress   Step 2: Coping strategies - Things I can do to take my mind off of my problems without contacting another person (relaxation technique, physical activity):    Distress Tolerance Strategies:  listen to positive and upbeat music, sensory based activities/self-soothe with five senses, watch a [employer] videos, pray, paced breathing/progressive muscle relaxation, intense exercise for 2-3 minutes, ride light rail      Physical Activities: go for a walk, exercise - running, deep breathing, weight lifting    Focus on helpful thoughts:  \"I will get through this\", \"It always passes\", \"Ride the wave\", think about an affirmation/compliment someone has said to me - \"you're " "my favorite\"  Step 3: People and social settings that provide distraction:   Name: Wendy (friend) Phone: [phone]   Name: Ashlee (roommate) Phone: [phone]   Name: Cesario (brother)  Phone: [phone]    Safe places - movie theater, park, [employer], work   Step 4: Remind myself of people and things that are important to me and worth living for: family, close friends, coworkers, future relationship/family/dog, freedom and money to travel, helping global warming, having a job that matters  Step 5: When I am in crisis, I can ask these people to help me use my safety plan:   Name: Wendy (friend) Phone: [phone]   Name: Ashlee (roommate) Phone: [phone]   Name: Cesario (brother)  Phone: [phone]  Step 6: Making the environment safe:     remove alcohol  Step 7: Professionals or agencies I can contact during a crisis:    [facility] Daytime and After Hours Crisis Number: 644-616-5450    Suicide Prevention Lifeline: 6-889-618-TALK (8255)    Crisis Text Line Service (available 24 hours a day, 7 days a week): Text [place] to 887403  Local Crisis Services: [facility] -- [phone]    Call 911 or go to my nearest emergency department.   I helped develop this safety plan and agree to use it when needed.  I have been given a copy of this plan.      Client signature _________________________________________________________________  Today s date:  6/5/2018  Adapted from Safety Plan Template 2008 Lisette Dorman and Joe Mcnally is reprinted with the express permission of the authors.  No portion of the Safety Plan Template may be reproduced without the express, written permission.  You can contact the authors at [email or web address] or [email or web address].      "

## 2018-12-01 ASSESSMENT — ANXIETY QUESTIONNAIRES: GAD7 TOTAL SCORE: 12

## 2018-12-14 ENCOUNTER — OFFICE VISIT (OUTPATIENT)
Dept: PSYCHOLOGY | Facility: CLINIC | Age: 18
End: 2018-12-14
Payer: COMMERCIAL

## 2018-12-14 DIAGNOSIS — F33.1 MAJOR DEPRESSIVE DISORDER, RECURRENT, MODERATE (H): Primary | ICD-10-CM

## 2018-12-14 DIAGNOSIS — F41.1 GENERALIZED ANXIETY DISORDER: ICD-10-CM

## 2018-12-14 PROCEDURE — 90834 PSYTX W PT 45 MINUTES: CPT | Performed by: COUNSELOR

## 2018-12-14 ASSESSMENT — ANXIETY QUESTIONNAIRES
2. NOT BEING ABLE TO STOP OR CONTROL WORRYING: MORE THAN HALF THE DAYS
6. BECOMING EASILY ANNOYED OR IRRITABLE: MORE THAN HALF THE DAYS
1. FEELING NERVOUS, ANXIOUS, OR ON EDGE: SEVERAL DAYS
IF YOU CHECKED OFF ANY PROBLEMS ON THIS QUESTIONNAIRE, HOW DIFFICULT HAVE THESE PROBLEMS MADE IT FOR YOU TO DO YOUR WORK, TAKE CARE OF THINGS AT HOME, OR GET ALONG WITH OTHER PEOPLE: SOMEWHAT DIFFICULT
3. WORRYING TOO MUCH ABOUT DIFFERENT THINGS: MORE THAN HALF THE DAYS
7. FEELING AFRAID AS IF SOMETHING AWFUL MIGHT HAPPEN: MORE THAN HALF THE DAYS
GAD7 TOTAL SCORE: 13
5. BEING SO RESTLESS THAT IT IS HARD TO SIT STILL: NEARLY EVERY DAY

## 2018-12-14 ASSESSMENT — PATIENT HEALTH QUESTIONNAIRE - PHQ9
SUM OF ALL RESPONSES TO PHQ QUESTIONS 1-9: 17
5. POOR APPETITE OR OVEREATING: SEVERAL DAYS

## 2018-12-14 NOTE — PROGRESS NOTES
"                                           Progress Note    Client Name: Sivan Mathew  Date: 12/14/2018         Service Type: Individual      Session Start Time: 9:00a  Session End Time: 9:45a      Session Length: 45 minutes     Session #: 14     Attendees: Client attended alone    Treatment Plan Last Reviewed: 12/14/2018  PHQ-9 / LEYLA-7 : 17 & 13       DATA      Progress Since Last Session (Related to Symptoms / Goals / Homework):   Symptoms: No change, continued difficulties with depression and anxiety due to school and interpersonal stressors, see Epic for PHQ 9 and LEYLA 7 updates     Homework: Partially ompleted and continued - client will work to challenge negative thinking with more neutral thoughts and engage in behavioral changes to improve mood. Client will also work to have assertive communication with internship supervisor and friend.      Episode of Care Goals: Satisfactory progress - ACTION (Actively working towards change); Intervened by reinforcing change plan / affirming steps taken     Current / Ongoing Stressors and Concerns:   Ongoing depression and anxiety as it is finals week; acknowledged ongoing need to assess work life balance; reported new interpersonal stress re: friend from hs; described self loathing and decreased self esteem as a result of interacting with friend, yet struggles to detach from friend; acknowledged a need for assertive communication as she will have class with friend next semester and cannot avoid friend; was skillful as she identified talking points that highlighted her negative contributions to relationship (\"I don't like the person that I am in the relationship...I lie, am negative...\").     Treatment Objective(s) Addressed in This Session:    Client will learn 3 anxiety management strategies. Client will identify and practice 3 new strategies for dealing with strong emotions as they relate to SI. Client will feel less tired and more energy during the " "day.     Intervention:   CBT: identity and challenge self-defeating and maladaptive thoughts, self-talk, and behaviors, teach and reinforce proactive interpersonal communication and problem solving skills, identify emotions and potential underlying reasons/functions of emotions, teach assertiveness skills, reinforce self-awareness and proactive problem solving and help seeking behaviors, teach identifying self-affirmations, assess eating behaviors/patterns, teach about Maslow's hierarchy of needs; Motivational Interviewing: open-ended questions, affirmations, reflections, reinforce personal control and choices, challenge sustain talk, evoke change talk, reinforce natural coping skills, explore ambivalence and roadblocks to change, teach OARS and active listening skills; DBT: teach interpersonal effectiveness and dialectical thinking as it relates to relationships (self vs others), reinforce wise mind and dialectical thinking to address black and white thinking, teach active listening for effective communication, reinforce PLEASE skills, reinforce behavior chain analysis for cognitive behavioral changes          ASSESSMENT: Current Emotional / Mental Status (status of significant symptoms):   Risk status (Self / Other harm or suicidal ideation)   Client reports the following current fears or concerns for personal safety: lives in off campus housing.  Client reports the following current or recent suicidal ideation or behaviors: onset - middle school, reports low self esteem, weight issues, and experienced bullying; states SI comes in waves and continued into college - no SI freshman year, but this year SI has worsened - hopelessness for the future, \"it's not worth it, what's the point, you haven't done anything, you won't do anything, accomplish anything\"; denies attempts, hospitalizations, plans; hardik by distracting, hanging out with friends, and getting \"really into something like watching a show\".   Client denies " current or recent homicidal ideation or behaviors.  Client reports current or recent self injurious behavior or ideation including onset - high school, superficial cutting, no scars, last self-harm Sept 2017.  Client denies other safety concerns.  A safety and risk management plan has been developed at this time, see below  Client reports there are no firearms in the house.     Appearance:   Appropriate    Eye Contact:   Good    Psychomotor Behavior: Normal    Attitude:   Cooperative    Orientation:   All   Speech    Rate / Production: Normal     Volume:  Normal    Mood:    Depressed Anxious   Affect:    Appropriate Mood congruent    Thought Content:  Clear   Thought Form:  Coherent  Logical  Goal oriented    Insight:    Good     Medication Review:   See Epic for updates     Medication Compliance:   Yes     Changes in Health Issues:   None reported     Chemical Use Review:   Substance Use: Chemical use reviewed, no active concerns identified      Tobacco Use: No current tobacco use     Collateral Reports Completed:   Not Applicable      PLAN: (Client Tasks / Therapist Tasks / Other)  Therapist will assign homework of using safety plan; role-play effective communication skills; teach emotional regulation skills. Continue to reinforce interpersonal effectiveness and self-awareness. Continue to reinforce active listening, self-care, and help seeking behaviors. Continue to reinforce self-care and effective communication to reduce negative self-image. Continue to reinforce problem solving skills and effective help seeking behaviors.         Ning Montiel [facility]                                                         ________________________________________________________________________    Treatment Plan    Client's Name: Sivan Mathew  YOB: 2000    Date: 11/16/2018    Diagnoses: 300.02 (F41.1) Generalized Anxiety Disorder & 296.32 (F33.1) Major Depressive Disorder, Recurrent Episode, Moderate; Hx  Anorexia Nervosa  Psychosocial & Contextual Factors: School stress, relationship stress  WHODAS: 16    Referral / Collaboration:  Was/were discussed and client will pursue - see PCP for medication.    Anticipated number of session or this episode of care: 12      MeasurableTreatment Goal(s) related to diagnosis / functional impairment(s)  Goal 1: Client will improve mood as evidenced by decreased score on PHQ 9 from 10 (moderate) to 5 or less (mild).    I will know I've met my goal when I have fewer intrusive thoughts and more energy.      Objective #A (Client Action)    Client will identify and practice 3 new strategies for dealing with strong emotions as they relate to SI.  Status: Continued - Date: 11/16/2018    Intervention(s)  Therapist will assign homework of using safety plan; role-play effective communication skills; teach emotional regulation skills.    Objective #B  Client will feel less tired and more energy during the day.  Status: Continued - Date: 11/16/2018    Intervention(s)  Therapist will assign homework of social leisure planning and routine development; provide educational materials on what is self care; role-play assertiveness skills; teach emotional recognition/identification.     Goal 2: Client will better manage anxiety as evidenced by decreased score on LEYLA 7 from 5 to 0.     I will know I've met my goal when I have ewer anxious tendencies (talking loud, overly giggly and bright).      Objective #A (Client Action)    Client will learn 3 anxiety management strategies.  Status: Continued - Date: 11/16/2018    Intervention(s)  Therapist will assign homework of skill practice; provide educational materials on relaxation/mindfulness; role-play conflict management; teach the client how to perform a behavioral chain analysis.      Client has reviewed and agreed to the above plan.      [facility]  11/16/2018                                                 Sivan WELSH  "PLAN:  Step 1: Warning signs / cues (Thoughts, images, mood, situation, behavior) that a crisis may be developing:    Thoughts: \"I don't matter\", \"People would be better off without me - get on without me\", \"I'm a burden\", \"I can't do this anymore\", \"I wish I was dead\", \"It would benefit everyone if I was dead\", \"I wish I could freeze and take a breath\", \"This is how it's always going to be\", \"You're going to be unhappy in the future - nothing is going to change\"    Images: obsessive thoughts of death or dying - thinking about bridges, how people would react, what would happen to finances and other responsibilities attached to me    Thinking Processes: ruminations (can't stop thinking about my problems), racing thoughts, intrusive thoughts (bothersome, unwanted thoughts that come out of nowhere), highly critical and negative thoughts    Mood: worsening depression and disinhibited (not caring about things or consequences)    Behaviors: isolating/withdrawing , using alcohol, impulsive, reckless behaviors (acting without thinking), not taking care of myself and not taking care of my responsibilities, can't talk to people, can't give direct eye contact, can't process situations     Situations: public shame: over sharing then feeling bad when others react, relationship problems, school stress   Step 2: Coping strategies - Things I can do to take my mind off of my problems without contacting another person (relaxation technique, physical activity):    Distress Tolerance Strategies:  listen to positive and upbeat music, sensory based activities/self-soothe with five senses, watch a funny youtube videos, pray, paced breathing/progressive muscle relaxation, intense exercise for 2-3 minutes, ride light rail      Physical Activities: go for a walk, exercise - running, deep breathing, weight lifting    Focus on helpful thoughts:  \"I will get through this\", \"It always passes\", \"Ride the wave\", think about an " "affirmation/compliment someone has said to me - \"you're my favorite\"  Step 3: People and social settings that provide distraction:   Name: Wendy (friend) Phone: [phone]   Name: Ashlee (roommate) Phone: [phone]   Name: Cesario (brother)  Phone: [phone]    Safe places - movie theater, park, [employer], work   Step 4: Remind myself of people and things that are important to me and worth living for: family, close friends, coworkers, future relationship/family/dog, freedom and money to travel, helping global warming, having a job that matters  Step 5: When I am in crisis, I can ask these people to help me use my safety plan:   Name: Wendy (friend) Phone: [phone]   Name: Ashlee (roommate) Phone: [phone]   Name: Cesario (brother)  Phone: [phone]  Step 6: Making the environment safe:     remove alcohol  Step 7: Professionals or agencies I can contact during a crisis:    [facility] Daytime and After Hours Crisis Number: 242-138-6239    Suicide Prevention Lifeline: 5-853-349-TALK (8209)    Crisis Text Line Service (available 24 hours a day, 7 days a week): Text [place] to 489068  [facility] Crisis Services: [facility] -- [phone]    Call 911 or go to my nearest emergency department.   I helped develop this safety plan and agree to use it when needed.  I have been given a copy of this plan.      Client signature _________________________________________________________________  Today s date:  6/5/2018  Adapted from Safety Plan Template 2008 Lisette Dorman and Joe Mcnally is reprinted with the express permission of the authors.  No portion of the Safety Plan Template may be reproduced without the express, written permission.  You can contact the authors at [email or web address] or [email or web address].    "

## 2018-12-15 ASSESSMENT — ANXIETY QUESTIONNAIRES: GAD7 TOTAL SCORE: 13

## 2019-01-01 ENCOUNTER — MYC REFILL (OUTPATIENT)
Dept: FAMILY MEDICINE | Facility: CLINIC | Age: 19
End: 2019-01-01

## 2019-01-01 DIAGNOSIS — F41.1 GENERALIZED ANXIETY DISORDER: ICD-10-CM

## 2019-01-01 DIAGNOSIS — F33.1 MAJOR DEPRESSIVE DISORDER, RECURRENT, MODERATE (H): ICD-10-CM

## 2019-01-01 RX ORDER — FLUOXETINE 40 MG/1
40 CAPSULE ORAL DAILY
Qty: 30 CAPSULE | Refills: 1 | Status: CANCELLED | OUTPATIENT
Start: 2019-01-01

## 2019-01-03 ENCOUNTER — MYC REFILL (OUTPATIENT)
Dept: FAMILY MEDICINE | Facility: CLINIC | Age: 19
End: 2019-01-03

## 2019-01-03 DIAGNOSIS — F33.1 MAJOR DEPRESSIVE DISORDER, RECURRENT, MODERATE (H): ICD-10-CM

## 2019-01-03 DIAGNOSIS — F41.1 GENERALIZED ANXIETY DISORDER: ICD-10-CM

## 2019-01-03 RX ORDER — FLUOXETINE 40 MG/1
40 CAPSULE ORAL DAILY
Qty: 30 CAPSULE | Refills: 1 | Status: CANCELLED | OUTPATIENT
Start: 2019-01-03

## 2019-01-04 ENCOUNTER — TELEPHONE (OUTPATIENT)
Dept: FAMILY MEDICINE | Facility: CLINIC | Age: 19
End: 2019-01-04

## 2019-01-04 NOTE — TELEPHONE ENCOUNTER
CC'd chart note from 11/28 copied below:    Call Sivan first week of January and repeat PHQ-9 and LEYLA-7   Thanks!   Daly Rodriges PA-C       Thanks!  Russ Grande

## 2019-01-04 NOTE — TELEPHONE ENCOUNTER
"Requested Prescriptions   Pending Prescriptions Disp Refills     FLUoxetine (PROZAC) 40 MG capsule  Last Written Prescription Date:  11/28/2018  Last Fill Quantity: 30 capsule,  # refills: 1   Last office visit: 11/28/2018 with prescribing provider:  ESTELA Rodriges   Future Office Visit:   Next 5 appointments (look out 90 days)    Jan 24, 2019 10:00 AM CST  Return Visit with Ning Montiel [facility] ([place] [address] [place] [id number]  [phone]   Feb 07, 2019 10:00 AM CST  Return Visit with Ning Montiel [facility] ([place] [address] [place] [address]  [phone]   Feb 21, 2019 10:00 AM CST  Return Visit with Ning Montiel [place] [address] [address]  [phone]          30 capsule 1     Sig: Take 1 capsule (40 mg) by mouth daily    SSRIs Protocol Failed - 1/4/2019  3:16 PM       Failed - PHQ-9 score less than 5 in past 6 months    Please review last PHQ-9 score.   PHQ-9 SCORE 11/28/2018 11/30/2018 12/14/2018   PHQ-9 Total Score - - -   PHQ-9 Total Score MyChart - - -   PHQ-9 Total Score 17 16 17     LEYLA-7 SCORE 11/28/2018 11/30/2018 12/14/2018   Total Score - - -   Total Score 12 12 13          Passed - Medication is active on med list       Passed - Patient is age 18 or older       Passed - No active pregnancy on record       Passed - No positive pregnancy test in last 12 months       Passed - Recent (6 mo) or future (30 days) visit within the authorizing provider's specialty    Patient had office visit in the last 6 months or has a visit in the next 30 days with authorizing provider or within the authorizing provider's specialty.  See \"Patient Info\" tab in inbasket, or \"Choose Columns\" in Meds & Orders section of the refill encounter.  "

## 2019-01-04 NOTE — TELEPHONE ENCOUNTER
"Requested Prescriptions   Pending Prescriptions Disp Refills     FLUoxetine (PROZAC) 40 MG capsule  Last Written Prescription Date:  11/28/2018  Last Fill Quantity: 30 capsule,  # refills: 1   Last office visit: 11/28/2018 with prescribing provider:  ESTELA Rodriges   Future Office Visit:   Next 5 appointments (look out 90 days)    Jan 24, 2019 10:00 AM CST  Return Visit with Ning Montiel [facility] ([place] [address] [place] [phone]  [phone]   Feb 07, 2019 10:00 AM CST  Return Visit with Ning Montiel [facility] ([place] [address] [place] [address]  [phone]   Feb 21, 2019 10:00 AM CST  Return Visit with Ning Montiel [place] [address] [address]  [phone]          30 capsule 1     Sig: Take 1 capsule (40 mg) by mouth daily    SSRIs Protocol Failed - 1/4/2019  3:37 PM       Failed - PHQ-9 score less than 5 in past 6 months    Please review last PHQ-9 score.     PHQ-9 SCORE 11/28/2018 11/30/2018 12/14/2018   PHQ-9 Total Score - - -   PHQ-9 Total Score MyChart - - -   PHQ-9 Total Score 17 16 17     LEYLA-7 SCORE 11/28/2018 11/30/2018 12/14/2018   Total Score - - -   Total Score 12 12 13          Passed - Medication is active on med list       Passed - Patient is age 18 or older       Passed - No active pregnancy on record       Passed - No positive pregnancy test in last 12 months       Passed - Recent (6 mo) or future (30 days) visit within the authorizing provider's specialty    Patient had office visit in the last 6 months or has a visit in the next 30 days with authorizing provider or within the authorizing provider's specialty.  See \"Patient Info\" tab in inbasket, or \"Choose Columns\" in Meds & Orders section of the refill encounter. "

## 2019-01-07 NOTE — TELEPHONE ENCOUNTER
"Patient was called on 1/4 (in another encounter) but was unable to leave a VM. Attempt #2 \"call could not be completed.\"  11/28 OV says: Nurse to call beginning of January for follow up.  Jocelin Mcnally RN   "

## 2019-01-08 ENCOUNTER — MYC MEDICAL ADVICE (OUTPATIENT)
Dept: FAMILY MEDICINE | Facility: CLINIC | Age: 19
End: 2019-01-08

## 2019-01-08 ENCOUNTER — MYC REFILL (OUTPATIENT)
Dept: FAMILY MEDICINE | Facility: CLINIC | Age: 19
End: 2019-01-08

## 2019-01-08 DIAGNOSIS — F33.1 MAJOR DEPRESSIVE DISORDER, RECURRENT, MODERATE (H): ICD-10-CM

## 2019-01-08 DIAGNOSIS — F41.1 GENERALIZED ANXIETY DISORDER: ICD-10-CM

## 2019-01-08 RX ORDER — FLUOXETINE 40 MG/1
40 CAPSULE ORAL DAILY
Qty: 30 CAPSULE | Refills: 1 | Status: CANCELLED | OUTPATIENT
Start: 2019-01-08

## 2019-01-09 NOTE — TELEPHONE ENCOUNTER
"Requested Prescriptions   Pending Prescriptions Disp Refills     FLUoxetine (PROZAC) 40 MG capsule  This may be a duplicate refill request  Last Written Prescription Date:  1/8/2018  Last Fill Quantity: 30 caps,  # refills: 1   Last office visit: 11/28/2018 with prescribing provider:  Antelmo   Future Office Visit:   Next 5 appointments (look out 90 days)    Jan 24, 2019 10:00 AM CST  Return Visit with Ning Montiel [facility] ([address] [address]  [phone]   Feb 07, 2019 10:00 AM CST  Return Visit with Ning Montiel [facility] ([address] [address]  [phone]   Feb 21, 2019 10:00 AM CST  Return Visit with Ning Montiel [address] [address]  [phone]          30 capsule 1     Sig: Take 1 capsule (40 mg) by mouth daily    SSRIs Protocol Failed - 1/9/2019  8:38 AM   PHQ-9 SCORE 11/30/2018 12/14/2018 1/7/2019   PHQ-9 Total Score - - -   PHQ-9 Total Score MyChart - - -   PHQ-9 Total Score 16 17 14     LEYLA-7 SCORE 11/30/2018 12/14/2018 1/7/2019   Total Score - - -   Total Score 12 13 10           Failed - PHQ-9 score less than 5 in past 6 months    Please review last PHQ-9 score.          Passed - Medication is active on med list       Passed - Patient is age 18 or older       Passed - No active pregnancy on record       Passed - No positive pregnancy test in last 12 months       Passed - Recent (6 mo) or future (30 days) visit within the authorizing provider's specialty    Patient had office visit in the last 6 months or has a visit in the next 30 days with authorizing provider or within the authorizing provider's specialty.  See \"Patient Info\" tab in inbasket, or \"Choose Columns\" in Meds & Orders " section of the refill encounter.

## 2019-01-15 ENCOUNTER — MYC REFILL (OUTPATIENT)
Dept: FAMILY MEDICINE | Facility: CLINIC | Age: 19
End: 2019-01-15

## 2019-01-15 DIAGNOSIS — F33.1 MAJOR DEPRESSIVE DISORDER, RECURRENT, MODERATE (H): ICD-10-CM

## 2019-01-15 DIAGNOSIS — F41.1 GENERALIZED ANXIETY DISORDER: ICD-10-CM

## 2019-01-15 NOTE — TELEPHONE ENCOUNTER
Rx was sent by PCP along with Parrablehart message for anxiety and depression update.    Closing encounter.    Teresa Lira RN  [facility]

## 2019-01-16 NOTE — TELEPHONE ENCOUNTER
"Requested Prescriptions   Pending Prescriptions Disp Refills     FLUoxetine (PROZAC) 40 MG capsule  Last Written Prescription Date:  1/8/2019  Last Fill Quantity: 30 caps,  # refills: 1   Last office visit: 11/28/2018 with prescribing provider:  Antelmo   Future Office Visit:   Next 5 appointments (look out 90 days)    Jan 24, 2019 10:00 AM CST  Return Visit with Ning Montiel [facility] ([place] [address] [place] [address]  [phone]   Feb 07, 2019 10:00 AM CST  Return Visit with Ning Montiel [facility] ([place] [address] [place] [address]  [phone]   Feb 21, 2019 10:00 AM CST  Return Visit with Ning Montiel [place] [id number] [address]  [phone]          30 capsule 1     Sig: Take 1 capsule (40 mg) by mouth daily    SSRIs Protocol Failed - 1/16/2019  9:03 AM       Failed - PHQ-9 score less than 5 in past 6 months    Please review last PHQ-9 score.      PHQ-9 SCORE 11/30/2018 12/14/2018 1/7/2019   PHQ-9 Total Score - - -   PHQ-9 Total Score MyChart - - -   PHQ-9 Total Score 16 17 14     LEYLA-7 SCORE 11/30/2018 12/14/2018 1/7/2019   Total Score - - -   Total Score 12 13 10           Passed - Medication is active on med list       Passed - Patient is age 18 or older       Passed - No active pregnancy on record       Passed - No positive pregnancy test in last 12 months       Passed - Recent (6 mo) or future (30 days) visit within the authorizing provider's specialty    Patient had office visit in the last 6 months or has a visit in the next 30 days with authorizing provider or within the authorizing provider's specialty.  See \"Patient Info\" tab in inbasket, or \"Choose Columns\" in Meds & Orders section of the refill encounter.        "

## 2019-01-17 RX ORDER — FLUOXETINE 40 MG/1
40 CAPSULE ORAL DAILY
Qty: 30 CAPSULE | Refills: 1 | Status: SHIPPED | OUTPATIENT
Start: 2019-01-17 | End: 2019-03-11

## 2019-01-21 ENCOUNTER — MYC REFILL (OUTPATIENT)
Dept: FAMILY MEDICINE | Facility: CLINIC | Age: 19
End: 2019-01-21

## 2019-01-21 DIAGNOSIS — F41.1 GENERALIZED ANXIETY DISORDER: ICD-10-CM

## 2019-01-21 DIAGNOSIS — F33.1 MAJOR DEPRESSIVE DISORDER, RECURRENT, MODERATE (H): ICD-10-CM

## 2019-01-21 RX ORDER — FLUOXETINE 40 MG/1
40 CAPSULE ORAL DAILY
Qty: 30 CAPSULE | Refills: 1 | Status: CANCELLED | OUTPATIENT
Start: 2019-01-21

## 2019-01-22 ENCOUNTER — MYC MEDICAL ADVICE (OUTPATIENT)
Dept: PSYCHOLOGY | Facility: CLINIC | Age: 19
End: 2019-01-22

## 2019-01-22 NOTE — TELEPHONE ENCOUNTER
Prozac 40 mg, 30 capsules with one refill was sent to [facility] on 1/17/19.    MyChart response routed to patient advising she call pharmacy for this.  Teresa Lira RN  [facility]

## 2019-01-28 ENCOUNTER — PSYCHE (OUTPATIENT)
Dept: PSYCHOLOGY | Facility: CLINIC | Age: 19
End: 2019-01-28
Payer: COMMERCIAL

## 2019-01-28 DIAGNOSIS — F41.1 GENERALIZED ANXIETY DISORDER: ICD-10-CM

## 2019-01-28 DIAGNOSIS — F33.1 MAJOR DEPRESSIVE DISORDER, RECURRENT, MODERATE (H): Primary | ICD-10-CM

## 2019-01-28 PROCEDURE — 90834 PSYTX W PT 45 MINUTES: CPT | Performed by: COUNSELOR

## 2019-01-28 ASSESSMENT — PATIENT HEALTH QUESTIONNAIRE - PHQ9
SUM OF ALL RESPONSES TO PHQ QUESTIONS 1-9: 21
5. POOR APPETITE OR OVEREATING: SEVERAL DAYS

## 2019-01-28 ASSESSMENT — ANXIETY QUESTIONNAIRES
GAD7 TOTAL SCORE: 12
5. BEING SO RESTLESS THAT IT IS HARD TO SIT STILL: SEVERAL DAYS
1. FEELING NERVOUS, ANXIOUS, OR ON EDGE: MORE THAN HALF THE DAYS
6. BECOMING EASILY ANNOYED OR IRRITABLE: NEARLY EVERY DAY
3. WORRYING TOO MUCH ABOUT DIFFERENT THINGS: MORE THAN HALF THE DAYS
IF YOU CHECKED OFF ANY PROBLEMS ON THIS QUESTIONNAIRE, HOW DIFFICULT HAVE THESE PROBLEMS MADE IT FOR YOU TO DO YOUR WORK, TAKE CARE OF THINGS AT HOME, OR GET ALONG WITH OTHER PEOPLE: VERY DIFFICULT
7. FEELING AFRAID AS IF SOMETHING AWFUL MIGHT HAPPEN: SEVERAL DAYS
2. NOT BEING ABLE TO STOP OR CONTROL WORRYING: MORE THAN HALF THE DAYS

## 2019-01-28 NOTE — PROGRESS NOTES
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 By next appt, client will follow through with medications, identify and implement self harm replacement behavior, and connect with friend about self harm.      Episode of Care Goals: Satisfactory progress - ACTION (Actively working towards change); Intervened by reinforcing change plan / affirming steps taken     Current / Ongoing Stressors and Concerns:   Reported worsened depression and anxiety after [employer] conference where she acknowledged patterns of low self worth and engaging in SIB for punishment and emotion regulation - admitted to down playing behaviors/experiences/feelings all her life; reported increase in SIB after conference (not eating, picking at her nails until they bleed, hair pulling, not taking medications...) and noticed lack of interest and engagement as well - \"autopilot\"; reported she has not felt safe to seek support from friends as well as feeling like a burden to others; denied SI, more wanting to punish self for shameful experiences and to self regulate emotions.      Treatment Objective(s) Addressed in This Session:    Client will learn 3 anxiety management strategies. Client will identify and practice 3 new strategies for dealing with strong emotions as they relate to SI. Client will feel less tired and more energy during the day.     Intervention:   CBT: identity and " "challenge self-defeating and maladaptive thoughts, self-talk, and behaviors, teach and reinforce proactive interpersonal communication and problem solving skills, identify emotions and potential underlying reasons/functions of emotions, teach assertiveness skills, reinforce self-awareness and proactive problem solving and help seeking behaviors, teach identifying self-affirmations, assess eating behaviors/patterns, teach about Maslow's hierarchy of needs; Motivational Interviewing: open-ended questions, affirmations, reflections, reinforce personal control and choices, challenge sustain talk, evoke change talk, reinforce natural coping skills, explore ambivalence and roadblocks to change, teach OARS and active listening skills; DBT: teach interpersonal effectiveness and dialectical thinking as it relates to relationships (self vs others), reinforce wise mind and dialectical thinking to address black and white thinking, teach active listening for effective communication, reinforce PLEASE skills, reinforce behavior chain analysis for cognitive behavioral changes          ASSESSMENT: Current Emotional / Mental Status (status of significant symptoms):   Risk status (Self / Other harm or suicidal ideation)   Client reports the following current fears or concerns for personal safety: lives in off campus housing.  Client reports the following current or recent suicidal ideation or behaviors: onset - middle school, reports low self esteem, weight issues, and experienced bullying; states SI comes in waves and continued into college - no SI freshman year, but sophmore year SI has worsened - hopelessness for the future, \"it's not worth it, what's the point, you haven't done anything, you won't do anything, accomplish anything\"; denies attempts, hospitalizations, plans; hardik by distracting, hanging out with friends, and getting \"really into something like watching a show\".   1/28/2019 update - acknowledged patterns of low self " worth and engaging in SIB for punishment and emotion regulation (not eating, picking at her nails until they bleed, hair pulling, not taking medications).   Client denies current or recent homicidal ideation or behaviors.  Client reports current or recent self injurious behavior or ideation including onset - high school, superficial cutting, no scars, last self-harm Sept 2017.   Client denies other safety concerns.  A safety and risk management plan has been developed at this time, see below  Client reports there are no firearms in the house.     Appearance:   Appropriate    Eye Contact:   Good    Psychomotor Behavior: Normal    Attitude:   Cooperative    Orientation:   All   Speech    Rate / Production: Normal     Volume:  Normal    Mood:    Depressed Anxious Tearful   Affect:    Appropriate Mood congruent    Thought Content:  Clear   Thought Form:  Coherent  Logical  Goal oriented    Insight:    Good     Medication Review:   See Epic for updates     Medication Compliance:   No - out of medications     Changes in Health Issues:   None reported     Chemical Use Review:   Substance Use: Chemical use reviewed, no active concerns identified      Tobacco Use: No current tobacco use     Collateral Reports Completed:   Not Applicable      PLAN: (Client Tasks / Therapist Tasks / Other)  Therapist will assign homework of using safety plan; role-play effective communication skills; teach emotional regulation skills. Continue to reinforce interpersonal effectiveness and self-awareness. Continue to reinforce active listening, self-care, and help seeking behaviors. Continue to reinforce self-care and effective communication to reduce negative self-image. Continue to reinforce problem solving skills and effective help seeking behaviors. Monitor and assess SIB.        SELWYN Centeno                                                         ________________________________________________________________________    Treatment  Plan    Client's Name: Sivan Mathew  YOB: 2000    Date: 11/16/2018    Diagnoses: 300.02 (F41.1) Generalized Anxiety Disorder & 296.32 (F33.1) Major Depressive Disorder, Recurrent Episode, Moderate; Hx Anorexia Nervosa  Psychosocial & Contextual Factors: School stress, relationship stress  WHODAS: 16    Referral / Collaboration:  Was/were discussed and client will pursue - see PCP for medication.    Anticipated number of session or this episode of care: 12      MeasurableTreatment Goal(s) related to diagnosis / functional impairment(s)  Goal 1: Client will improve mood as evidenced by decreased score on PHQ 9 from 10 (moderate) to 5 or less (mild).    I will know I've met my goal when I have fewer intrusive thoughts and more energy.      Objective #A (Client Action)    Client will identify and practice 3 new strategies for dealing with strong emotions as they relate to SI.  Status: Continued - Date: 11/16/2018    Intervention(s)  Therapist will assign homework of using safety plan; role-play effective communication skills; teach emotional regulation skills.    Objective #B  Client will feel less tired and more energy during the day.  Status: Continued - Date: 11/16/2018    Intervention(s)  Therapist will assign homework of social leisure planning and routine development; provide educational materials on what is self care; role-play assertiveness skills; teach emotional recognition/identification.     Goal 2: Client will better manage anxiety as evidenced by decreased score on LEYLA 7 from 5 to 0.     I will know I've met my goal when I have ewer anxious tendencies (talking loud, overly giggly and bright).      Objective #A (Client Action)    Client will learn 3 anxiety management strategies.  Status: Continued - Date: 11/16/2018    Intervention(s)  Therapist will assign homework of skill practice; provide educational materials on relaxation/mindfulness; role-play conflict management; teach the  "client how to perform a behavioral chain analysis.      Client has reviewed and agreed to the above plan.      Ning Montiel, [facility]  11/16/2018                                                 Sivan Mathew     SAFETY PLAN:  Step 1: Warning signs / cues (Thoughts, images, mood, situation, behavior) that a crisis may be developing:    Thoughts: \"I don't matter\", \"People would be better off without me - get on without me\", \"I'm a burden\", \"I can't do this anymore\", \"I wish I was dead\", \"It would benefit everyone if I was dead\", \"I wish I could freeze and take a breath\", \"This is how it's always going to be\", \"You're going to be unhappy in the future - nothing is going to change\"    Images: obsessive thoughts of death or dying - thinking about bridges, how people would react, what would happen to finances and other responsibilities attached to me    Thinking Processes: ruminations (can't stop thinking about my problems), racing thoughts, intrusive thoughts (bothersome, unwanted thoughts that come out of nowhere), highly critical and negative thoughts    Mood: worsening depression and disinhibited (not caring about things or consequences)    Behaviors: isolating/withdrawing , using alcohol, impulsive, reckless behaviors (acting without thinking), not taking care of myself and not taking care of my responsibilities, can't talk to people, can't give direct eye contact, can't process situations     Situations: public shame: over sharing then feeling bad when others react, relationship problems, school stress   Step 2: Coping strategies - Things I can do to take my mind off of my problems without contacting another person (relaxation technique, physical activity):    Distress Tolerance Strategies:  listen to positive and upbeat music, sensory based activities/self-soothe with five senses, watch a funny youtube videos, pray, paced breathing/progressive muscle relaxation, intense exercise for 2-3 minutes, ride light rail  " "    Physical Activities: go for a walk, exercise - running, deep breathing, weight lifting    Focus on helpful thoughts:  \"I will get through this\", \"It always passes\", \"Ride the wave\", think about an affirmation/compliment someone has said to me - \"you're my favorite\"  Step 3: People and social settings that provide distraction:   Name: Wendy (friend) Phone: [phone]   Name: Ashlee (roommate) Phone: [phone]   Name: Cesario (brother)  Phone: [phone]    Safe places - movie theater, park, [employer], work   Step 4: Remind myself of people and things that are important to me and worth living for: family, close friends, coworkers, future relationship/family/dog, freedom and money to travel, helping global warming, having a job that matters  Step 5: When I am in crisis, I can ask these people to help me use my safety plan:   Name: Wendy (friend) Phone: [phone]   Name: Ashlee (roommate) Phone: [phone]   Name: Cesario (brother)  Phone: [phone]  Step 6: Making the environment safe:     remove alcohol  Step 7: Professionals or agencies I can contact during a crisis:    [facility] Daytime and After Hours Crisis Number: 854-201-8769    Suicide Prevention Lifeline: 5-367-146-TALK (8255)    Crisis Text Line Service (available 24 hours a day, 7 days a week): Text [place] to 564068  Local Crisis Services: [facility] -- [phone]    Call 911 or go to my nearest emergency department.   I helped develop this safety plan and agree to use it when needed.  I have been given a copy of this plan.      Client signature _________________________________________________________________  Today s date:  6/5/2018  Adapted from Safety Plan Template 2008 Lisette Dorman and Joe Mcnally is reprinted with the express permission of the authors.  No portion of the Safety Plan Template may be reproduced without the express, written permission.  You can contact the authors at bhs@[email or web address] or " [email or web address].

## 2019-01-29 ASSESSMENT — ANXIETY QUESTIONNAIRES: GAD7 TOTAL SCORE: 12

## 2019-02-11 ENCOUNTER — OFFICE VISIT (OUTPATIENT)
Dept: PSYCHOLOGY | Facility: CLINIC | Age: 19
End: 2019-02-11
Payer: COMMERCIAL

## 2019-02-11 DIAGNOSIS — F41.1 GENERALIZED ANXIETY DISORDER: ICD-10-CM

## 2019-02-11 DIAGNOSIS — F33.1 MAJOR DEPRESSIVE DISORDER, RECURRENT, MODERATE (H): Primary | ICD-10-CM

## 2019-02-11 PROCEDURE — 90834 PSYTX W PT 45 MINUTES: CPT | Performed by: COUNSELOR

## 2019-02-11 ASSESSMENT — ANXIETY QUESTIONNAIRES
1. FEELING NERVOUS, ANXIOUS, OR ON EDGE: MORE THAN HALF THE DAYS
5. BEING SO RESTLESS THAT IT IS HARD TO SIT STILL: SEVERAL DAYS
2. NOT BEING ABLE TO STOP OR CONTROL WORRYING: SEVERAL DAYS
6. BECOMING EASILY ANNOYED OR IRRITABLE: NEARLY EVERY DAY
3. WORRYING TOO MUCH ABOUT DIFFERENT THINGS: MORE THAN HALF THE DAYS
GAD7 TOTAL SCORE: 11
IF YOU CHECKED OFF ANY PROBLEMS ON THIS QUESTIONNAIRE, HOW DIFFICULT HAVE THESE PROBLEMS MADE IT FOR YOU TO DO YOUR WORK, TAKE CARE OF THINGS AT HOME, OR GET ALONG WITH OTHER PEOPLE: SOMEWHAT DIFFICULT
7. FEELING AFRAID AS IF SOMETHING AWFUL MIGHT HAPPEN: SEVERAL DAYS

## 2019-02-11 ASSESSMENT — PATIENT HEALTH QUESTIONNAIRE - PHQ9
SUM OF ALL RESPONSES TO PHQ QUESTIONS 1-9: 17
5. POOR APPETITE OR OVEREATING: SEVERAL DAYS

## 2019-02-11 NOTE — PROGRESS NOTES
Progress Note    Client Name: Sivan Mathew  Date: 2/11/2019         Service Type: Individual      Session Start Time: 5:30p  Session End Time: 6:15p      Session Length: 45 minutes     Session #: 16     Attendees: Client attended alone    Treatment Plan Last Reviewed: 2/11/2019  PHQ-9 / LEYLA-7 : 17 & 11       DATA      Progress Since Last Session (Related to Symptoms / Goals / Homework):   Symptoms: Worsened, no change, see Epic for PHQ 9 and LEYLA 7 updates     Homework: Partially completed and continued - client will follow through with medications (attempted to, but pharmacy said they needed a different insurance card), identify and implement self harm replacement behavior, and connect with friend about self harm (completed). By next appt, client will communicate with mother about prescription card, add vegetables to dinners, continue to get rest/sleep, and continue to exercise 1x week, set limits with friends (balance friend and personal time).       Episode of Care Goals: Some progress - ACTION (Actively working towards change); Intervened by reinforcing change plan / affirming steps taken     Current / Ongoing Stressors and Concerns:   Continued worsened depression and anxiety due to poor self care, feeling overwhelmed, not on medications, interpersonal strain, caregiving/feeling drained from friends; reported difficulty prioritizing her needs, feels she may be distracting herself from her own problems by focusing on others; denied SI, but identified lack of worth and self sabotaging.     Treatment Objective(s) Addressed in This Session:    Client will learn 3 anxiety management strategies. Client will identify and practice 3 new strategies for dealing with strong emotions as they relate to SI. Client will feel less tired and more energy during the day.     Intervention:   CBT: identity and challenge self-defeating and maladaptive thoughts, self-talk, and  "behaviors, teach and reinforce proactive interpersonal communication and problem solving skills, identify emotions and potential underlying reasons/functions of emotions, teach assertiveness skills, reinforce self-awareness and proactive problem solving and help seeking behaviors, teach identifying self-affirmations, assess eating behaviors/patterns, teach about Maslow's hierarchy of needs; Motivational Interviewing: open-ended questions, affirmations, reflections, reinforce personal control and choices, challenge sustain talk, evoke change talk, reinforce natural coping skills, explore ambivalence and roadblocks to change, teach OARS and active listening skills; DBT: teach interpersonal effectiveness and dialectical thinking as it relates to relationships (self vs others), reinforce wise mind and dialectical thinking to address black and white thinking, teach active listening for effective communication, reinforce PLEASE skills, reinforce behavior chain analysis for cognitive behavioral changes          ASSESSMENT: Current Emotional / Mental Status (status of significant symptoms):   Risk status (Self / Other harm or suicidal ideation)   Client reports the following current fears or concerns for personal safety: lives in off campus housing.  Client reports the following current or recent suicidal ideation or behaviors: onset - middle school, reports low self esteem, weight issues, and experienced bullying; states SI comes in waves and continued into college - no SI freshman year, but sophmore year SI has worsened - hopelessness for the future, \"it's not worth it, what's the point, you haven't done anything, you won't do anything, accomplish anything\"; denies attempts, hospitalizations, plans; hardik by distracting, hanging out with friends, and getting \"really into something like watching a show\".   1/28/2019 update - acknowledged patterns of low self worth and engaging in SIB for punishment and emotion regulation " (not eating, picking at her nails until they bleed, hair pulling, not taking medications).   Client denies current or recent homicidal ideation or behaviors.  Client reports current or recent self injurious behavior or ideation including onset - high school, superficial cutting, no scars, last self-harm Sept 2017.   Client denies other safety concerns.  A safety and risk management plan has been developed at this time, see below  Client reports there are no firearms in the house.     Appearance:   Appropriate    Eye Contact:   Good    Psychomotor Behavior: Normal    Attitude:   Cooperative    Orientation:   All   Speech    Rate / Production: Normal     Volume:  Normal    Mood:    Depressed Anxious    Affect:    Appropriate Mood congruent    Thought Content:  Clear   Thought Form:  Coherent  Logical  Goal oriented    Insight:    Good     Medication Review:   See Epic for updates     Medication Compliance:   No - out of medications     Changes in Health Issues:   None reported     Chemical Use Review:   Substance Use: Chemical use reviewed, no active concerns identified - one day of binge drinking as a method of coping     Tobacco Use: No current tobacco use     Collateral Reports Completed:   Not Applicable      PLAN: (Client Tasks / Therapist Tasks / Other)  Therapist will assign homework of using safety plan; role-play effective communication skills; teach emotional regulation skills. Continue to reinforce interpersonal effectiveness and self-awareness. Continue to reinforce active listening, self-care, and help seeking behaviors. Continue to reinforce self-care and effective communication to reduce negative self-image. Continue to reinforce problem solving skills and effective help seeking behaviors. Monitor and assess SIB. Reinforce PLEASE skills.         SELWYN Centeno                                                         ________________________________________________________________________    Treatment  Plan    Client's Name: Sivan Mathew  YOB: 2000    Date: 11/16/2018    Diagnoses: 300.02 (F41.1) Generalized Anxiety Disorder & 296.32 (F33.1) Major Depressive Disorder, Recurrent Episode, Moderate; Hx Anorexia Nervosa  Psychosocial & Contextual Factors: School stress, relationship stress  WHODAS: 16    Referral / Collaboration:  Was/were discussed and client will pursue - see PCP for medication.    Anticipated number of session or this episode of care: 12      MeasurableTreatment Goal(s) related to diagnosis / functional impairment(s)  Goal 1: Client will improve mood as evidenced by decreased score on PHQ 9 from 10 (moderate) to 5 or less (mild).    I will know I've met my goal when I have fewer intrusive thoughts and more energy.      Objective #A (Client Action)    Client will identify and practice 3 new strategies for dealing with strong emotions as they relate to SI.  Status: Continued - Date: 11/16/2018    Intervention(s)  Therapist will assign homework of using safety plan; role-play effective communication skills; teach emotional regulation skills.    Objective #B  Client will feel less tired and more energy during the day.  Status: Continued - Date: 11/16/2018    Intervention(s)  Therapist will assign homework of social leisure planning and routine development; provide educational materials on what is self care; role-play assertiveness skills; teach emotional recognition/identification.     Goal 2: Client will better manage anxiety as evidenced by decreased score on LEYLA 7 from 5 to 0.     I will know I've met my goal when I have ewer anxious tendencies (talking loud, overly giggly and bright).      Objective #A (Client Action)    Client will learn 3 anxiety management strategies.  Status: Continued - Date: 11/16/2018    Intervention(s)  Therapist will assign homework of skill practice; provide educational materials on relaxation/mindfulness; role-play conflict management; teach the  "client how to perform a behavioral chain analysis.      Client has reviewed and agreed to the above plan.      Ning Montiel, [facility]  11/16/2018                                                 Sivan Mathew     SAFETY PLAN:  Step 1: Warning signs / cues (Thoughts, images, mood, situation, behavior) that a crisis may be developing:    Thoughts: \"I don't matter\", \"People would be better off without me - get on without me\", \"I'm a burden\", \"I can't do this anymore\", \"I wish I was dead\", \"It would benefit everyone if I was dead\", \"I wish I could freeze and take a breath\", \"This is how it's always going to be\", \"You're going to be unhappy in the future - nothing is going to change\"    Images: obsessive thoughts of death or dying - thinking about bridges, how people would react, what would happen to finances and other responsibilities attached to me    Thinking Processes: ruminations (can't stop thinking about my problems), racing thoughts, intrusive thoughts (bothersome, unwanted thoughts that come out of nowhere), highly critical and negative thoughts    Mood: worsening depression and disinhibited (not caring about things or consequences)    Behaviors: isolating/withdrawing , using alcohol, impulsive, reckless behaviors (acting without thinking), not taking care of myself and not taking care of my responsibilities, can't talk to people, can't give direct eye contact, can't process situations     Situations: public shame: over sharing then feeling bad when others react, relationship problems, school stress   Step 2: Coping strategies - Things I can do to take my mind off of my problems without contacting another person (relaxation technique, physical activity):    Distress Tolerance Strategies:  listen to positive and upbeat music, sensory based activities/self-soothe with five senses, watch a funny youtube videos, pray, paced breathing/progressive muscle relaxation, intense exercise for 2-3 minutes, ride light rail  " "    Physical Activities: go for a walk, exercise - running, deep breathing, weight lifting    Focus on helpful thoughts:  \"I will get through this\", \"It always passes\", \"Ride the wave\", think about an affirmation/compliment someone has said to me - \"you're my favorite\"  Step 3: People and social settings that provide distraction:   Name: Wendy (friend) Phone: [phone]   Name: Ashlee (roommate) Phone: [phone]   Name: Cesario (brother)  Phone: [phone]    Safe places - movie theater, park, [place], work   Step 4: Remind myself of people and things that are important to me and worth living for: family, close friends, coworkers, future relationship/family/dog, freedom and money to travel, helping global warming, having a job that matters  Step 5: When I am in crisis, I can ask these people to help me use my safety plan:   Name: Wendy (friend) Phone: [phone]   Name: Ashlee (roommate) Phone: [phone]   Name: Cesario (brother)  Phone: [phone]  Step 6: Making the environment safe:     remove alcohol  Step 7: Professionals or agencies I can contact during a crisis:    [facility] Daytime and After Hours Crisis Number: 115-343-7734    Suicide Prevention Lifeline: 3-919-821-TALK (8255)    Crisis Text Line Service (available 24 hours a day, 7 days a week): Text [place] to 158206  Local Crisis Services: [facility] -- [phone]    Call 911 or go to my nearest emergency department.   I helped develop this safety plan and agree to use it when needed.  I have been given a copy of this plan.      Client signature _________________________________________________________________  Today s date:  6/5/2018  Adapted from Safety Plan Template 2008 Lisette Dorman and Joe Mcnally is reprinted with the express permission of the authors.  No portion of the Safety Plan Template may be reproduced without the express, written permission.  You can contact the authors at [email or web address] or " [email or web address].

## 2019-02-12 ASSESSMENT — ANXIETY QUESTIONNAIRES: GAD7 TOTAL SCORE: 11

## 2019-03-08 ENCOUNTER — OFFICE VISIT (OUTPATIENT)
Dept: PSYCHOLOGY | Facility: CLINIC | Age: 19
End: 2019-03-08
Payer: COMMERCIAL

## 2019-03-08 DIAGNOSIS — F33.1 MAJOR DEPRESSIVE DISORDER, RECURRENT, MODERATE (H): Primary | ICD-10-CM

## 2019-03-08 DIAGNOSIS — F41.1 GENERALIZED ANXIETY DISORDER: ICD-10-CM

## 2019-03-08 PROCEDURE — 90834 PSYTX W PT 45 MINUTES: CPT | Performed by: COUNSELOR

## 2019-03-08 ASSESSMENT — ANXIETY QUESTIONNAIRES
2. NOT BEING ABLE TO STOP OR CONTROL WORRYING: MORE THAN HALF THE DAYS
GAD7 TOTAL SCORE: 10
3. WORRYING TOO MUCH ABOUT DIFFERENT THINGS: NEARLY EVERY DAY
7. FEELING AFRAID AS IF SOMETHING AWFUL MIGHT HAPPEN: NOT AT ALL
1. FEELING NERVOUS, ANXIOUS, OR ON EDGE: SEVERAL DAYS
5. BEING SO RESTLESS THAT IT IS HARD TO SIT STILL: SEVERAL DAYS
6. BECOMING EASILY ANNOYED OR IRRITABLE: MORE THAN HALF THE DAYS
IF YOU CHECKED OFF ANY PROBLEMS ON THIS QUESTIONNAIRE, HOW DIFFICULT HAVE THESE PROBLEMS MADE IT FOR YOU TO DO YOUR WORK, TAKE CARE OF THINGS AT HOME, OR GET ALONG WITH OTHER PEOPLE: SOMEWHAT DIFFICULT

## 2019-03-08 ASSESSMENT — PATIENT HEALTH QUESTIONNAIRE - PHQ9
SUM OF ALL RESPONSES TO PHQ QUESTIONS 1-9: 16
5. POOR APPETITE OR OVEREATING: SEVERAL DAYS

## 2019-03-08 NOTE — PROGRESS NOTES
Progress Note    Client Name: Sivan Mathew  Date: 3/8/2019         Service Type: Individual   Video Visit: No     Session Start Time: 3:30p  Session End Time: 4:15p      Session Length: 45 minutes     Session #: 17     Attendees: Client attended alone    Treatment Plan Last Reviewed: 3/8/2019  PHQ-9 / LEYLA-7 : 16 & 10       DATA  Interactive Complexity: No  Crisis: No       Progress Since Last Session (Related to Symptoms / Goals / Homework):   Symptoms: Similar scores, but reported doing better overall, see Epic for PHQ 9 and LEYLA 7 updates     Homework: Partially completed and continued - client will communicate with mother about prescription card, add vegetables to dinners, continue to get rest/sleep, and continue to exercise 1x week, set limits with friends (balance friend and personal time).       Episode of Care Goals: Some progress - ACTION (Actively working towards change); Intervened by reinforcing change plan / affirming steps taken     Current / Ongoing Stressors and Concerns:   -Improved depression and anxiety - taking medications as prescribed, got acceptance into , feeling more energized about school work, connecting appropriately with friends  -New concerns re: alcohol use, described excessive drinking, high tolerance, black outs, drinking on weekends and weekdays, receiving feedback from friends that she drinks a lot, acknowledged ambivalence to change behavior as it is still new     Treatment Objective(s) Addressed in This Session:    Client will learn 3 anxiety management strategies. Client will identify and practice 3 new strategies for dealing with strong emotions as they relate to SI. Client will feel less tired and more energy during the day.     Intervention:   CBT: identity and challenge self-defeating and maladaptive thoughts, self-talk, and behaviors, teach and reinforce proactive interpersonal communication and problem solving  "skills, identify emotions and potential underlying reasons/functions of emotions, teach assertiveness skills, reinforce self-awareness and proactive problem solving and help seeking behaviors, teach identifying self-affirmations, assess eating behaviors/patterns, teach about Maslow's hierarchy of needs; Motivational Interviewing: open-ended questions, affirmations, reflections, reinforce personal control and choices, challenge sustain talk, evoke change talk, reinforce natural coping skills, explore ambivalence and roadblocks to change, teach OARS and active listening skills, attend to behaviors and reg flags re: drinking; DBT: teach interpersonal effectiveness and dialectical thinking as it relates to relationships (self vs others), reinforce wise mind and dialectical thinking to address black and white thinking, teach active listening for effective communication, reinforce PLEASE skills, reinforce behavior chain analysis for cognitive behavioral changes          ASSESSMENT: Current Emotional / Mental Status (status of significant symptoms):   Risk status (Self / Other harm or suicidal ideation)   Client reports the following current fears or concerns for personal safety: lives in off campus housing.  Client reports the following current or recent suicidal ideation or behaviors: onset - middle school, reports low self esteem, weight issues, and experienced bullying; states SI comes in waves and continued into college - no SI freshman year, but sophmore year SI has worsened - hopelessness for the future, \"it's not worth it, what's the point, you haven't done anything, you won't do anything, accomplish anything\"; denies attempts, hospitalizations, plans; hardik by distracting, hanging out with friends, and getting \"really into something like watching a show\".   1/28/2019 update - acknowledged patterns of low self worth and engaging in SIB for punishment and emotion regulation (not eating, picking at her nails until " they bleed, hair pulling, not taking medications).   Client denies current or recent homicidal ideation or behaviors.  Client reports current or recent self injurious behavior or ideation including onset - high school, superficial cutting, no scars, last self-harm Sept 2017.   Client denies other safety concerns.  A safety and risk management plan has been developed at this time, see below  Client reports there are no firearms in the house.  Client reports there has been a change in risk factors since their last session - excessive drinking   Client reports there has been no change in protective factors since their last session - , taking medications as prescribed.      Appearance:   Appropriate    Eye Contact:   Good    Psychomotor Behavior: Normal    Attitude:   Cooperative    Orientation:   All   Speech    Rate / Production: Normal     Volume:  Normal    Mood:    Depressed Anxious    Affect:    Appropriate Mood congruent Bright at times   Thought Content:  Clear   Thought Form:  Coherent  Logical  Goal oriented    Insight:    Good     Medication Review:   See Epic for updates     Medication Compliance:   Yes     Changes in Health Issues:   None reported     Chemical Use Review:   Substance Use: Reports excessive drinking, high tolerance, black outs, drinking on weekends and weekdays, receiving feedback from friends that she drinks a lot     Tobacco Use: No current tobacco use     Collateral Reports Completed:   Not Applicable     Diagnoses:    Generalized Anxiety Disorder & Major Depressive Disorder, Recurrent Episode, Moderate      PLAN: (Client Tasks / Therapist Tasks / Other)  Therapist will assign homework of using safety plan; role-play effective communication skills; teach emotional regulation skills. Continue to reinforce interpersonal effectiveness and self-awareness. Continue to reinforce active listening, self-care, and help seeking behaviors. Continue to reinforce self-care and effective  communication to reduce negative self-image. Continue to reinforce problem solving skills and effective help seeking behaviors. Monitor and assess SIB. Reinforce PLEASE skills.     By next appt, client will monitor drinking, communicate with PCP about increasing medication, and make lunch 1x week.        Ning Montiel [facility]                                                         ________________________________________________________________________    Treatment Plan    Client's Name: Sivan Mathew  YOB: 2000    Date: 3/8/2019    Diagnoses: 300.02 (F41.1) Generalized Anxiety Disorder & 296.32 (F33.1) Major Depressive Disorder, Recurrent Episode, Moderate; Hx Anorexia Nervosa; RULE OUT Alcohol Use Disorder  Psychosocial & Contextual Factors: School stress, relationship stress  WHODAS: 16    Referral / Collaboration:  Was/were discussed and client will pursue - see PCP for medication.    Anticipated number of session or this episode of care: 12      MeasurableTreatment Goal(s) related to diagnosis / functional impairment(s)  Goal 1: Client will improve mood as evidenced by decreased score on PHQ 9 from 10 (moderate) to 5 or less (mild).    I will know I've met my goal when I have fewer intrusive thoughts and more energy.      Objective #A (Client Action)    Client will identify and practice 3 new strategies for dealing with strong emotions as they relate to SI.  Status: Continued - Date: 3/8/2019, variable success, ct is working to seek out support from friends and to reduce stress that may contribute to risks for SI    Intervention(s)  Therapist will assign homework of using safety plan; role-play effective communication skills; teach emotional regulation skills.    Objective #B  Client will feel less tired and more energy during the day.  Status: Continued - Date: 3/8/2019, variable success, improved in the past month    Intervention(s)  Therapist will assign homework of social leisure planning and  "routine development; provide educational materials on what is self care; role-play assertiveness skills; teach emotional recognition/identification.     Goal 2: Client will better manage anxiety as evidenced by decreased score on LEYLA 7 from 5 to 0.     I will know I've met my goal when I have ewer anxious tendencies (talking loud, overly giggly and bright).      Objective #A (Client Action)    Client will learn 3 anxiety management strategies.  Status: Continued - Date: 3/8/2019    Intervention(s)  Therapist will assign homework of skill practice; provide educational materials on relaxation/mindfulness; role-play conflict management; teach the client how to perform a behavioral chain analysis.      Client has reviewed and agreed to the above plan.      Ning Montiel, [facility]  11/16/2018                                                 Sivan Mathew     SAFETY PLAN:  Step 1: Warning signs / cues (Thoughts, images, mood, situation, behavior) that a crisis may be developing:    Thoughts: \"I don't matter\", \"People would be better off without me - get on without me\", \"I'm a burden\", \"I can't do this anymore\", \"I wish I was dead\", \"It would benefit everyone if I was dead\", \"I wish I could freeze and take a breath\", \"This is how it's always going to be\", \"You're going to be unhappy in the future - nothing is going to change\"    Images: obsessive thoughts of death or dying - thinking about bridges, how people would react, what would happen to finances and other responsibilities attached to me    Thinking Processes: ruminations (can't stop thinking about my problems), racing thoughts, intrusive thoughts (bothersome, unwanted thoughts that come out of nowhere), highly critical and negative thoughts    Mood: worsening depression and disinhibited (not caring about things or consequences)    Behaviors: isolating/withdrawing , using alcohol, impulsive, reckless behaviors (acting without thinking), not taking care of myself and not " "taking care of my responsibilities, can't talk to people, can't give direct eye contact, can't process situations     Situations: public shame: over sharing then feeling bad when others react, relationship problems, school stress   Step 2: Coping strategies - Things I can do to take my mind off of my problems without contacting another person (relaxation technique, physical activity):    Distress Tolerance Strategies:  listen to positive and upbeat music, sensory based activities/self-soothe with five senses, watch a funny youtube videos, pray, paced breathing/progressive muscle relaxation, intense exercise for 2-3 minutes, ride light rail      Physical Activities: go for a walk, exercise - running, deep breathing, weight lifting    Focus on helpful thoughts:  \"I will get through this\", \"It always passes\", \"Ride the wave\", think about an affirmation/compliment someone has said to me - \"you're my favorite\"  Step 3: People and social settings that provide distraction:   Name: Wendy (friend) Phone: [phone]   Name: Ashlee (roommate) Phone: [phone]   Name: Cesario (brother)  Phone: [phone]    Safe places - movie theater, park, Holiness, work   Step 4: Remind myself of people and things that are important to me and worth living for: family, close friends, coworkers, future relationship/family/dog, freedom and money to travel, helping global warming, having a job that matters  Step 5: When I am in crisis, I can ask these people to help me use my safety plan:   Name: Wendy (friend) Phone: [phone]   Name: Ashlee (roommate) Phone: [phone]   Name: Cesario (brother)  Phone: [phone]  Step 6: Making the environment safe:     remove alcohol  Step 7: Professionals or agencies I can contact during a crisis:    [facility] Daytime and After Hours Crisis Number: 733-876-1128    Suicide Prevention Lifeline: 6-824-174-TALK (8251)    Crisis Text Line Service (available 24 hours a day, 7 days a " week): Text [place] to 153787  Local Crisis Services: JAMEE Andrews - 719-590-2017    Call 911 or go to my nearest emergency department.   I helped develop this safety plan and agree to use it when needed.  I have been given a copy of this plan.      Client signature _________________________________________________________________  Today s date:  6/5/2018  Adapted from Safety Plan Template 2008 Lisette Dorman and Joe Mcnally is reprinted with the express permission of the authors.  No portion of the Safety Plan Template may be reproduced without the express, written permission.  You can contact the authors at [email or web address] or [email or web address].

## 2019-03-09 ASSESSMENT — ANXIETY QUESTIONNAIRES: GAD7 TOTAL SCORE: 10

## 2019-03-11 ENCOUNTER — MYC REFILL (OUTPATIENT)
Dept: FAMILY MEDICINE | Facility: CLINIC | Age: 19
End: 2019-03-11

## 2019-03-11 DIAGNOSIS — F33.1 MAJOR DEPRESSIVE DISORDER, RECURRENT, MODERATE (H): ICD-10-CM

## 2019-03-11 DIAGNOSIS — F41.1 GENERALIZED ANXIETY DISORDER: ICD-10-CM

## 2019-03-11 NOTE — TELEPHONE ENCOUNTER
"Requested Prescriptions   Pending Prescriptions Disp Refills     FLUoxetine (PROZAC) 40 MG capsule  Last Written Prescription Date:  1/17/2019  Last Fill Quantity: 30 caps,  # refills: 1   Last office visit: 11/28/2018 with prescribing provider:  Antelmo   Future Office Visit:   Next 5 appointments (look out 90 days)    Mar 18, 2019  4:30 PM CDT  Return Visit with Ning Montiel [facility]  [address] [place], [place] [id number]  [facility] [address]  [phone]   Mar 29, 2019  3:30 PM CDT  Return Visit with Ning Montiel [facility] ([facility]  [address] [place], [place] [id number]  [facility] [id number]  [phone]   Apr 12, 2019  3:30 PM CDT  Return Visit with Ning Montiel [facility]  [address] [place], [place] [address]  [facility] [id number]  [phone]          30 capsule 1     Sig: Take 1 capsule (40 mg) by mouth daily    SSRIs Protocol Failed - 3/11/2019  4:12 PM       Failed - PHQ-9 score less than 5 in past 6 months    Please review last PHQ-9 score.          Passed - Medication is active on med list       Passed - Patient is age 18 or older       Passed - No active pregnancy on record       Passed - No positive pregnancy test in last 12 months       Passed - Recent (6 mo) or future (30 days) visit within the authorizing provider's specialty    Patient had office visit in the last 6 months or has a visit in the next 30 days with authorizing provider or within the authorizing provider's specialty.  See \"Patient Info\" tab in inbasket, or \"Choose Columns\" in Meds & Orders section of the refill encounter.              "

## 2019-03-13 RX ORDER — FLUOXETINE 40 MG/1
40 CAPSULE ORAL DAILY
Qty: 90 CAPSULE | Refills: 0 | Status: SHIPPED | OUTPATIENT
Start: 2019-03-13 | End: 2019-05-14

## 2019-03-13 NOTE — TELEPHONE ENCOUNTER
Will be due for 6 month f/u in May  Refill sent to last until then  Appears she has continued with therapy.    Daly Rodriges PA-C

## 2019-03-22 ENCOUNTER — HEALTH MAINTENANCE LETTER (OUTPATIENT)
Age: 19
End: 2019-03-22

## 2019-03-29 ENCOUNTER — OFFICE VISIT (OUTPATIENT)
Dept: PSYCHOLOGY | Facility: CLINIC | Age: 19
End: 2019-03-29
Payer: COMMERCIAL

## 2019-03-29 DIAGNOSIS — F41.1 GENERALIZED ANXIETY DISORDER: ICD-10-CM

## 2019-03-29 DIAGNOSIS — F33.1 MAJOR DEPRESSIVE DISORDER, RECURRENT, MODERATE (H): Primary | ICD-10-CM

## 2019-03-29 PROCEDURE — 90834 PSYTX W PT 45 MINUTES: CPT | Performed by: COUNSELOR

## 2019-03-29 ASSESSMENT — ANXIETY QUESTIONNAIRES
7. FEELING AFRAID AS IF SOMETHING AWFUL MIGHT HAPPEN: NOT AT ALL
GAD7 TOTAL SCORE: 11
IF YOU CHECKED OFF ANY PROBLEMS ON THIS QUESTIONNAIRE, HOW DIFFICULT HAVE THESE PROBLEMS MADE IT FOR YOU TO DO YOUR WORK, TAKE CARE OF THINGS AT HOME, OR GET ALONG WITH OTHER PEOPLE: SOMEWHAT DIFFICULT
5. BEING SO RESTLESS THAT IT IS HARD TO SIT STILL: SEVERAL DAYS
6. BECOMING EASILY ANNOYED OR IRRITABLE: MORE THAN HALF THE DAYS
1. FEELING NERVOUS, ANXIOUS, OR ON EDGE: MORE THAN HALF THE DAYS
3. WORRYING TOO MUCH ABOUT DIFFERENT THINGS: NEARLY EVERY DAY
2. NOT BEING ABLE TO STOP OR CONTROL WORRYING: NEARLY EVERY DAY

## 2019-03-29 ASSESSMENT — PATIENT HEALTH QUESTIONNAIRE - PHQ9
5. POOR APPETITE OR OVEREATING: NOT AT ALL
SUM OF ALL RESPONSES TO PHQ QUESTIONS 1-9: 16

## 2019-03-29 NOTE — PROGRESS NOTES
"                                           Progress Note    Client Name: Sivan Mathew  Date: 3/29/2019         Service Type: Individual   Video Visit: No     Session Start Time: 2:50p  Session End Time: 3:35p      Session Length: 45 minutes     Session #: 18     Attendees: Client attended alone    Treatment Plan Last Reviewed: 3//2019  PHQ-9 / LEYLA-7 : 16 & 11       DATA  Interactive Complexity: No  Crisis: No       Progress Since Last Session (Related to Symptoms / Goals / Homework):   Symptoms: Worsened, see Epic for PHQ 9 and LEYLA 7 updates     Homework: Partially completed and continued - client will monitor drinking, communicate with PCP about increasing medication, and make lunch 1x week.       Episode of Care Goals: Some progress - ACTION (Actively working towards change); Intervened by reinforcing change plan / affirming steps taken     Current / Ongoing Stressors and Concerns:   -Reported worsened depression, \"crashing\" after spring break, struggled to connect with friends, not wanting to eat, SI with no plans   -Provider reflected on PHQ 9 scores re: poor appetite and wondered if ct need eating disorder evaluation as ct has a hx of eating disorder, ct agreed to call [facility] for more information    -Reported strain in friend [place], ambivalent about how to manage situation     Treatment Objective(s) Addressed in This Session:    Client will learn 3 anxiety management strategies. Client will identify and practice 3 new strategies for dealing with strong emotions as they relate to SI. Client will feel less tired and more energy during the day.     Intervention:   CBT: identity and challenge self-defeating and maladaptive thoughts, self-talk, and behaviors, teach and reinforce proactive interpersonal communication and problem solving skills, identify emotions and potential underlying reasons/functions of emotions, teach assertiveness skills, reinforce self-awareness and proactive problem solving and " "help seeking behaviors, teach identifying self-affirmations, assess eating behaviors/patterns, teach about Maslow's hierarchy of needs; Motivational Interviewing: open-ended questions, affirmations, reflections, reinforce personal control and choices, challenge sustain talk, evoke change talk, reinforce natural coping skills, explore ambivalence and roadblocks to change, teach OARS and active listening skills, attend to behaviors and reg flags re: drinking; DBT: teach interpersonal effectiveness and dialectical thinking as it relates to relationships (self vs others), reinforce wise mind and dialectical thinking to address black and white thinking, teach active listening for effective communication, reinforce PLEASE skills, reinforce behavior chain analysis for cognitive behavioral changes          ASSESSMENT: Current Emotional / Mental Status (status of significant symptoms):   Risk status (Self / Other harm or suicidal ideation)   Client reports the following current fears or concerns for personal safety: lives in off campus housing.  Client reports the following current or recent suicidal ideation or behaviors: onset - middle school, reports low self esteem, weight issues, and experienced bullying; states SI comes in waves and continued into college - no SI freshman year, but sophmore year SI has worsened - hopelessness for the future, \"it's not worth it, what's the point, you haven't done anything, you won't do anything, accomplish anything\"; denies attempts, hospitalizations, plans; hardik by distracting, hanging out with friends, and getting \"really into something like watching a show\".   1/28/2019 update - acknowledged patterns of low self worth and engaging in SIB for punishment and emotion regulation (not eating, picking at her nails until they bleed, hair pulling, not taking medications).   Client denies current or recent homicidal ideation or behaviors.  Client reports current or recent self injurious " behavior or ideation including onset - high school, superficial cutting, no scars, last self-harm Sept 2017.   Client denies other safety concerns.  A safety and risk management plan has been developed at this time, see below  Client reports there are no firearms in the house.  Client reports there has been a change in risk factors since their last session - excessive drinking   Client reports there has been no change in protective factors since their last session - , taking medications as prescribed.      Appearance:   Appropriate    Eye Contact:   Good    Psychomotor Behavior: Normal    Attitude:   Cooperative    Orientation:   All   Speech    Rate / Production: Normal     Volume:  Normal    Mood:    Depressed Anxious    Affect:    Appropriate Mood congruent Bright at times   Thought Content:  Clear   Thought Form:  Coherent  Logical  Goal oriented    Insight:    Good     Medication Review:   See Epic for updates     Medication Compliance:   Yes     Changes in Health Issues:   None reported     Chemical Use Review:   Substance Use: Decreased since last meeting, reports drinking 2 days per week 1-4 drinks at a time     Tobacco Use: No current tobacco use     Collateral Reports Completed:   Not Applicable     Diagnoses:    Generalized Anxiety Disorder & Major Depressive Disorder, Recurrent Episode, Moderate      PLAN: (Client Tasks / Therapist Tasks / Other)  Therapist will assign homework of using safety plan; role-play effective communication skills; teach emotional regulation skills. Continue to reinforce interpersonal effectiveness and self-awareness. Continue to reinforce active listening, self-care, and help seeking behaviors. Continue to reinforce self-care and effective communication to reduce negative self-image. Continue to reinforce problem solving skills and effective help seeking behaviors. Monitor and assess SIB. Reinforce PLEASE skills. Attend to patterns of maladaptive behavior.    By  next appt, client will call [facility] to gather information about cost, services, and wait list and schedule appt with PCP.        Ning Tiana, [facility]                                                         ________________________________________________________________________    Treatment Plan    Client's Name: Sivan Mathew  YOB: 2000    Date: 3/8/2019    Diagnoses: 300.02 (F41.1) Generalized Anxiety Disorder & 296.32 (F33.1) Major Depressive Disorder, Recurrent Episode, Moderate; Hx Anorexia Nervosa; RULE OUT Alcohol Use Disorder  Psychosocial & Contextual Factors: School stress, relationship stress  WHODAS: 16    Referral / Collaboration:  Was/were discussed and client will pursue - see PCP for medication.    Anticipated number of session or this episode of care: 12      MeasurableTreatment Goal(s) related to diagnosis / functional impairment(s)  Goal 1: Client will improve mood as evidenced by decreased score on PHQ 9 from 10 (moderate) to 5 or less (mild).    I will know I've met my goal when I have fewer intrusive thoughts and more energy.      Objective #A (Client Action)    Client will identify and practice 3 new strategies for dealing with strong emotions as they relate to SI.  Status: Continued - Date: 3/8/2019, variable success, ct is working to seek out support from friends and to reduce stress that may contribute to risks for SI    Intervention(s)  Therapist will assign homework of using safety plan; role-play effective communication skills; teach emotional regulation skills.    Objective #B  Client will feel less tired and more energy during the day.  Status: Continued - Date: 3/8/2019, variable success, improved in the past month    Intervention(s)  Therapist will assign homework of social leisure planning and routine development; provide educational materials on what is self care; role-play assertiveness skills; teach emotional recognition/identification.     Goal 2: Client  "will better manage anxiety as evidenced by decreased score on LEYLA 7 from 5 to 0.     I will know I've met my goal when I have ewer anxious tendencies (talking loud, overly giggly and bright).      Objective #A (Client Action)    Client will learn 3 anxiety management strategies.  Status: Continued - Date: 3/8/2019    Intervention(s)  Therapist will assign homework of skill practice; provide educational materials on relaxation/mindfulness; role-play conflict management; teach the client how to perform a behavioral chain analysis.      Client has reviewed and agreed to the above plan.      Ning Montiel, [facility]   3/8/2019                                                 Sivan Mathew     SAFETY PLAN:  Step 1: Warning signs / cues (Thoughts, images, mood, situation, behavior) that a crisis may be developing:    Thoughts: \"I don't matter\", \"People would be better off without me - get on without me\", \"I'm a burden\", \"I can't do this anymore\", \"I wish I was dead\", \"It would benefit everyone if I was dead\", \"I wish I could freeze and take a breath\", \"This is how it's always going to be\", \"You're going to be unhappy in the future - nothing is going to change\"    Images: obsessive thoughts of death or dying - thinking about bridges, how people would react, what would happen to finances and other responsibilities attached to me    Thinking Processes: ruminations (can't stop thinking about my problems), racing thoughts, intrusive thoughts (bothersome, unwanted thoughts that come out of nowhere), highly critical and negative thoughts    Mood: worsening depression and disinhibited (not caring about things or consequences)    Behaviors: isolating/withdrawing , using alcohol, impulsive, reckless behaviors (acting without thinking), not taking care of myself and not taking care of my responsibilities, can't talk to people, can't give direct eye contact, can't process situations     Situations: public shame: over sharing then feeling " "bad when others react, relationship problems, school stress   Step 2: Coping strategies - Things I can do to take my mind off of my problems without contacting another person (relaxation technique, physical activity):    Distress Tolerance Strategies:  listen to positive and upbeat music, sensory based activities/self-soothe with five senses, watch a funny youtube videos, pray, paced breathing/progressive muscle relaxation, intense exercise for 2-3 minutes, ride light rail      Physical Activities: go for a walk, exercise - running, deep breathing, weight lifting    Focus on helpful thoughts:  \"I will get through this\", \"It always passes\", \"Ride the wave\", think about an affirmation/compliment someone has said to me - \"you're my favorite\"  Step 3: People and social settings that provide distraction:   Name: Wendy (friend) Phone: [phone]   Name: Ashlee (roommate) Phone: [phone]   Name: Cesario (brother)  Phone: [phone]    Safe places - movie theater, park, [employer], work   Step 4: Remind myself of people and things that are important to me and worth living for: family, close friends, coworkers, future relationship/family/dog, freedom and money to travel, helping global warming, having a job that matters  Step 5: When I am in crisis, I can ask these people to help me use my safety plan:   Name: Wendy (friend) Phone: [phone]   Name: Ashlee (roommate) Phone: [phone]   Name: Cesario (brother)  Phone: [phone]  Step 6: Making the environment safe:     remove alcohol  Step 7: Professionals or agencies I can contact during a crisis:    [facility] Daytime and After Hours Crisis Number: [phone]    Suicide Prevention Lifeline: 8-849-040-TIZA (4807)    Crisis Text Line Service (available 24 hours a day, 7 days a week): Text [place] to 827243  Local Crisis Services: [facility] -- [phone]    Call 911 or go to my nearest emergency department.   I helped develop this safety " plan and agree to use it when needed.  I have been given a copy of this plan.      Client signature _________________________________________________________________  Today s date:  6/5/2018  Adapted from Safety Plan Template 2008 Lisette Dorman and Joe Mcnally is reprinted with the express permission of the authors.  No portion of the Safety Plan Template may be reproduced without the express, written permission.  You can contact the authors at [email or web address] or [email or web address].

## 2019-03-30 ASSESSMENT — ANXIETY QUESTIONNAIRES: GAD7 TOTAL SCORE: 11

## 2019-05-01 ENCOUNTER — TELEPHONE (OUTPATIENT)
Dept: PSYCHOLOGY | Facility: CLINIC | Age: 19
End: 2019-05-01

## 2019-05-07 ENCOUNTER — TELEPHONE (OUTPATIENT)
Dept: FAMILY MEDICINE | Facility: CLINIC | Age: 19
End: 2019-05-07

## 2019-05-07 DIAGNOSIS — F41.1 GENERALIZED ANXIETY DISORDER: ICD-10-CM

## 2019-05-07 DIAGNOSIS — F33.1 MAJOR DEPRESSIVE DISORDER, RECURRENT, MODERATE (H): ICD-10-CM

## 2019-05-07 NOTE — TELEPHONE ENCOUNTER
"Requested Prescriptions   Pending Prescriptions Disp Refills     FLUoxetine (PROZAC) 20 MG capsule [Pharmacy Med Name: FLUOXETINE HCL 20 MG CAPSULE]  Last Written Prescription Date:  4/7/2019  Last Fill Quantity: 90 capsule,  # refills: 0   Last office visit: 11/28/2018 with prescribing provider:  ESTELA Rodriges   Future Office Visit:   Next 5 appointments (look out 90 days)    May 16, 2019  4:30 PM CDT  Return Visit with Ning Montiel [facility] ([address] [address]  [phone]   Jun 03, 2019  5:30 PM CDT  Return Visit with Ning Montiel [facility] ([address] [address]  [phone]          90 capsule 0     Sig: TAKE 3 CAPSULES (60 MG) BY MOUTH DAILY       SSRIs Protocol Failed - 5/7/2019 10:38 AM        Failed - PHQ-9 score less than 5 in past 6 months     Please review last PHQ-9 score.   PHQ-9 SCORE 2/11/2019 3/8/2019 3/29/2019   PHQ-9 Total Score - - -   PHQ-9 Total Score MyChart - - -   PHQ-9 Total Score 17 16 16     LEYLA-7 SCORE 2/11/2019 3/8/2019 3/29/2019   Total Score - - -   Total Score 11 10 11           Passed - Medication is active on med list        Passed - Patient is age 18 or older        Passed - No active pregnancy on record        Passed - No positive pregnancy test in last 12 months        Passed - Recent (6 mo) or future (30 days) visit within the authorizing provider's specialty     Patient had office visit in the last 6 months or has a visit in the next 30 days with authorizing provider or within the authorizing provider's specialty.  See \"Patient Info\" tab in inbasket, or \"Choose Columns\" in Meds & Orders section of the refill encounter.              "

## 2019-05-08 NOTE — TELEPHONE ENCOUNTER
Routing refill request to provider for review/approval because:  Failed protocol: PHQ-9 > 5    Key Desai RN

## 2019-05-10 ENCOUNTER — TELEPHONE (OUTPATIENT)
Dept: FAMILY MEDICINE | Facility: CLINIC | Age: 19
End: 2019-05-10

## 2019-05-10 NOTE — TELEPHONE ENCOUNTER
Refill x 1 as is due for 6 month follow up and medication adjustment follow up. Okay for phone visit if this works better.     Daly Rodriges PA-C

## 2019-05-10 NOTE — TELEPHONE ENCOUNTER
Called patient and left a VM message to schedule a medication follow up appointment.  Phone appointment zaria Nam

## 2019-05-10 NOTE — TELEPHONE ENCOUNTER
Reason for Call:  Other appointment    Detailed comments: Patient would like to do a medication follow up over the phone if possible.    Phone Number Patient can be reached at: Home number on file [phone] (home)    Best Time: ASAP    Can we leave a detailed message on this number? YES    Call taken on 5/10/2019 at 3:07 PM by Trina Sorensen

## 2019-05-14 ENCOUNTER — VIRTUAL VISIT (OUTPATIENT)
Dept: FAMILY MEDICINE | Facility: CLINIC | Age: 19
End: 2019-05-14

## 2019-05-14 DIAGNOSIS — F41.1 GENERALIZED ANXIETY DISORDER: ICD-10-CM

## 2019-05-14 DIAGNOSIS — F33.1 MAJOR DEPRESSIVE DISORDER, RECURRENT, MODERATE (H): ICD-10-CM

## 2019-05-14 PROCEDURE — 99441 ZZC PHYSICIAN TELEPHONE EVALUATION 5-10 MIN: CPT | Performed by: PHYSICIAN ASSISTANT

## 2019-05-14 RX ORDER — FLUOXETINE 40 MG/1
40 CAPSULE ORAL DAILY
Qty: 90 CAPSULE | Refills: 0 | Status: CANCELLED | OUTPATIENT
Start: 2019-05-14

## 2019-05-14 ASSESSMENT — ANXIETY QUESTIONNAIRES
3. WORRYING TOO MUCH ABOUT DIFFERENT THINGS: MORE THAN HALF THE DAYS
1. FEELING NERVOUS, ANXIOUS, OR ON EDGE: MORE THAN HALF THE DAYS
IF YOU CHECKED OFF ANY PROBLEMS ON THIS QUESTIONNAIRE, HOW DIFFICULT HAVE THESE PROBLEMS MADE IT FOR YOU TO DO YOUR WORK, TAKE CARE OF THINGS AT HOME, OR GET ALONG WITH OTHER PEOPLE: SOMEWHAT DIFFICULT
5. BEING SO RESTLESS THAT IT IS HARD TO SIT STILL: MORE THAN HALF THE DAYS
6. BECOMING EASILY ANNOYED OR IRRITABLE: SEVERAL DAYS
7. FEELING AFRAID AS IF SOMETHING AWFUL MIGHT HAPPEN: NOT AT ALL
GAD7 TOTAL SCORE: 9
2. NOT BEING ABLE TO STOP OR CONTROL WORRYING: SEVERAL DAYS

## 2019-05-14 ASSESSMENT — PATIENT HEALTH QUESTIONNAIRE - PHQ9
SUM OF ALL RESPONSES TO PHQ QUESTIONS 1-9: 12
5. POOR APPETITE OR OVEREATING: SEVERAL DAYS

## 2019-05-14 NOTE — PROGRESS NOTES
"Sivan Mathew is a 19 year old female who is being evaluated via a telephone visit.      The patient has been notified of following:     \"This telephone visit will be conducted via a call between you and your physician/provider. We have found that certain health care needs can be provided without the need for a physical exam.  This service lets us provide the care you need with a short phone conversation.  If a prescription is necessary we can send it directly to your pharmacy.  If lab work is needed we can place an order for that and you can then stop by our lab to have the test done at a later time.    We will bill your insurance company for this service.  Please check with your medical insurance if this type of visit is covered. You may be responsible for the cost of this type of visit if insurance coverage is denied.  The typical cost is $30 (10min), $59(11-20min) and $85 (21-30min).  Most often these visits are shorter than 10 minutes.    If during the course of the call the physician/provider feels a telephone visit is not appropriate, you will not be charged for this service. \"     Consent has been obtained for this service by 1 care team member:yes. See the scanned image in the medical record.    Preferred patient phone number to be used for this call: [phone]     Sivan Mathew complains of  Anxiety and depression    Past Medical History:   Diagnosis Date     Appendicitis - 8/20/11 9/19/2011     Depressive disorder 04/2014    is on Zoloft for anxiety and has a small amount of depressio      Social History     Socioeconomic History     Marital status: Single     Spouse name: Not on file     Number of children: Not on file     Years of education: Not on file     Highest education level: Not on file   Occupational History     Not on file   Social Needs     Financial resource strain: Not on file     Food insecurity:     Worry: Not on file     Inability: Not on file     Transportation needs:     " Medical: Not on file     Non-medical: Not on file   Tobacco Use     Smoking status: Never Smoker     Smokeless tobacco: Never Used   Substance and Sexual Activity     Alcohol use: No     Drug use: No     Sexual activity: Never   Lifestyle     Physical activity:     Days per week: Not on file     Minutes per session: Not on file     Stress: Not on file   Relationships     Social connections:     Talks on phone: Not on file     Gets together: Not on file     Attends Buddhist service: Not on file     Active member of club or organization: Not on file     Attends meetings of clubs or organizations: Not on file     Relationship status: Not on file     Intimate partner violence:     Fear of current or ex partner: Not on file     Emotionally abused: Not on file     Physically abused: Not on file     Forced sexual activity: Not on file   Other Topics Concern     Parent/sibling w/ CABG, MI or angioplasty before 65F 55M? No   Social History Narrative     Not on file     ALLERGIES  Patient has no known allergies.        Catherine Lopez, Certified Medical Assistant (AAMA)  (MA signature)    Additional provider notes:  Follow up call after increasing dose of Prozac to 60 mg per day. Just finished school semester yesterday, has one year left. 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  Has noted improvement in her mood, no SI. Improved anxiety.  Does note worsening fatigue with higher dose.  Used to sleep 6-7 hours per night and is now needing 9 hours.    Assessment/Plan:  1. Major depressive disorder, recurrent, moderate (H)  2. Generalized anxiety disorder  Improving with higher dose of Prozac in addition to therapy and other self care.  Continue on current dose but try this in the evening given the fatigue.  F/u in 3-6 months, sooner if needed.  - FLUoxetine (PROZAC) 20 MG capsule; Take 3 capsules (60 mg) by mouth daily  Dispense: 270 capsule; Refill: 1    Total time spent on this phone visit with the  patient = 5 minutes     I have reviewed the note as documented above.  This accurately captures the substance of my conversation with the patient,    Daly Rodriges PA-C

## 2019-05-15 ASSESSMENT — ANXIETY QUESTIONNAIRES: GAD7 TOTAL SCORE: 9

## 2019-06-03 ENCOUNTER — OFFICE VISIT (OUTPATIENT)
Dept: PSYCHOLOGY | Facility: CLINIC | Age: 19
End: 2019-06-03
Payer: COMMERCIAL

## 2019-06-03 DIAGNOSIS — F33.1 MAJOR DEPRESSIVE DISORDER, RECURRENT, MODERATE (H): Primary | ICD-10-CM

## 2019-06-03 DIAGNOSIS — F41.1 GENERALIZED ANXIETY DISORDER: ICD-10-CM

## 2019-06-03 PROCEDURE — 90834 PSYTX W PT 45 MINUTES: CPT | Performed by: COUNSELOR

## 2019-06-03 ASSESSMENT — ANXIETY QUESTIONNAIRES
3. WORRYING TOO MUCH ABOUT DIFFERENT THINGS: MORE THAN HALF THE DAYS
7. FEELING AFRAID AS IF SOMETHING AWFUL MIGHT HAPPEN: SEVERAL DAYS
IF YOU CHECKED OFF ANY PROBLEMS ON THIS QUESTIONNAIRE, HOW DIFFICULT HAVE THESE PROBLEMS MADE IT FOR YOU TO DO YOUR WORK, TAKE CARE OF THINGS AT HOME, OR GET ALONG WITH OTHER PEOPLE: SOMEWHAT DIFFICULT
GAD7 TOTAL SCORE: 10
5. BEING SO RESTLESS THAT IT IS HARD TO SIT STILL: SEVERAL DAYS
1. FEELING NERVOUS, ANXIOUS, OR ON EDGE: MORE THAN HALF THE DAYS
2. NOT BEING ABLE TO STOP OR CONTROL WORRYING: SEVERAL DAYS
6. BECOMING EASILY ANNOYED OR IRRITABLE: MORE THAN HALF THE DAYS

## 2019-06-03 ASSESSMENT — PATIENT HEALTH QUESTIONNAIRE - PHQ9
SUM OF ALL RESPONSES TO PHQ QUESTIONS 1-9: 14
5. POOR APPETITE OR OVEREATING: SEVERAL DAYS

## 2019-06-03 NOTE — PROGRESS NOTES
Progress Note    Client Name: Sivan Mathew  Date: 6/3/2019         Service Type: Individual   Video Visit: No     Session Start Time: 5:30p  Session End Time: 6:15p      Session Length: 45 minutes     Session #: 19     Attendees: Client attended alone    Treatment Plan Last Reviewed: 6/3/2019  PHQ-9 / LEYLA-7 : 14 & 10       DATA  Interactive Complexity: No  Crisis: No       Progress Since Last Session (Related to Symptoms / Goals / Homework):   Symptoms: Mild improvements, see Epic for PHQ 9 and LEYLA 7 updates     Homework: Partially completed and continued - will call [facility] to gather information about cost, services, and wait list and schedule appt with PCP.       Episode of Care Goals: Some progress - ACTION (Actively working towards change); Intervened by reinforcing change plan / affirming steps taken     Current / Ongoing Stressors and Concerns:   Ongoing learning about herself and symptoms; reported limited insight and denial interfering with her ability to be honest with herself and others and therefore being surprised when she realizes how much she is struggling (e.g., was taken back by therapist's reflection that this past semester has been the most difficult for her); improvements due to connecting with a friend re: how emotionally out of touch she is with her body and how she needs to be more accepting of herself - reported this caused a shift in her ability to risk take and have more compassion for herself, allowing herself to dress a certain way, more confidence with body imagine; ongoing eating concerns, but is declining [facility] recommendation at this time.      Treatment Objective(s) Addressed in This Session:    Client will learn 3 anxiety management strategies. Client will identify and practice 3 new strategies for dealing with strong emotions as they relate to SI. Client will feel less tired and more energy during the  "day.     Intervention:   CBT: identity and challenge self-defeating and maladaptive thoughts, self-talk, and behaviors, teach and reinforce proactive interpersonal communication and problem solving skills, identify emotions and potential underlying reasons/functions of emotions, teach assertiveness skills, reinforce self-awareness and proactive problem solving and help seeking behaviors, teach identifying self-affirmations, assess eating behaviors/patterns, teach about Maslow's hierarchy of needs; Motivational Interviewing: open-ended questions, affirmations, reflections, reinforce personal control and choices, challenge sustain talk, evoke change talk, reinforce natural coping skills, explore ambivalence and roadblocks to change, teach OARS and active listening skills, attend to behaviors and reg flags re: drinking; DBT: teach interpersonal effectiveness and dialectical thinking as it relates to relationships (self vs others), reinforce wise mind and dialectical thinking to address black and white thinking, teach active listening for effective communication, reinforce PLEASE skills, reinforce behavior chain analysis for cognitive behavioral changes          ASSESSMENT: Current Emotional / Mental Status (status of significant symptoms):   Risk status (Self / Other harm or suicidal ideation)   Client reports the following current fears or concerns for personal safety: lives in off campus housing.  Client reports the following current or recent suicidal ideation or behaviors: onset - middle school, reports low self esteem, weight issues, and experienced bullying; states SI comes in waves and continued into college - no SI freshman year, but sophmore year SI has worsened - hopelessness for the future, \"it's not worth it, what's the point, you haven't done anything, you won't do anything, accomplish anything\"; denies attempts, hospitalizations, plans; hardik by distracting, hanging out with friends, and getting \"really " "into something like watching a show\".   1/28/2019 update - acknowledged patterns of low self worth and engaging in SIB for punishment and emotion regulation (not eating, picking at her nails until they bleed, hair pulling, not taking medications).   Client denies current or recent homicidal ideation or behaviors.  Client reports current or recent self injurious behavior or ideation including onset - high school, superficial cutting, no scars, last self-harm Sept 2017.   Client denies other safety concerns.  A safety and risk management plan has been developed at this time, see below  Client reports there are no firearms in the house.  Client reports there has been a change in risk factors since their last session - excessive drinking   Client reports there has been no change in protective factors since their last session - , taking medications as prescribed.      Appearance:   Appropriate    Eye Contact:   Good    Psychomotor Behavior: Normal    Attitude:   Cooperative    Orientation:   All   Speech    Rate / Production: Normal     Volume:  Normal    Mood:    Depressed Anxious    Affect:    Appropriate Mood congruent Bright at times   Thought Content:  Clear   Thought Form:  Coherent  Logical  Goal oriented    Insight:    Good     Medication Review:   See Epic for updates     Medication Compliance:   Yes     Changes in Health Issues:   None reported     Chemical Use Review:   Substance Use: Decreased since last meeting, reports drinking 2 days per week 1-4 drinks at a time     Tobacco Use: No current tobacco use     Collateral Reports Completed:   Not Applicable     Diagnoses:    Generalized Anxiety Disorder & Major Depressive Disorder, Recurrent Episode, Moderate      PLAN: (Client Tasks / Therapist Tasks / Other)  Therapist will assign homework of using safety plan; role-play effective communication skills; teach emotional regulation skills. Continue to reinforce interpersonal effectiveness and " self-awareness. Continue to reinforce active listening, self-care, and help seeking behaviors. Continue to reinforce self-care and effective communication to reduce negative self-image. Continue to reinforce problem solving skills and effective help seeking behaviors. Monitor and assess SIB. Reinforce PLEASE skills. Attend to patterns of maladaptive behavior.    By next appt, client will take meds as prescribed and practice/identify mind-body connection routine at internship.        Ning Montiel [facility]                                                         ________________________________________________________________________    Treatment Plan    Client's Name: Sivan Mathew  YOB: 2000    Date: 3/8/2019    Diagnoses: 300.02 (F41.1) Generalized Anxiety Disorder & 296.32 (F33.1) Major Depressive Disorder, Recurrent Episode, Moderate; Hx Anorexia Nervosa; RULE OUT Alcohol Use Disorder  Psychosocial & Contextual Factors: School stress, relationship stress  WHODAS: 16    Referral / Collaboration:  Was/were discussed and client will pursue - see PCP for medication. Client is declining [facility] recommendation at this time 6/3/2019.     Anticipated number of session or this episode of care: 12      MeasurableTreatment Goal(s) related to diagnosis / functional impairment(s)  Goal 1: Client will improve mood as evidenced by decreased score on PHQ 9 from 10 (moderate) to 5 or less (mild).    I will know I've met my goal when I have fewer intrusive thoughts and more energy.      Objective #A (Client Action)    Client will identify and practice 3 new strategies for dealing with strong emotions as they relate to SI.  Status: Continued - Date: 3/8/2019, variable success, ct is working to seek out support from friends and to reduce stress that may contribute to risks for SI    Intervention(s)  Therapist will assign homework of using safety plan; role-play effective communication skills; teach emotional  "regulation skills.    Objective #B  Client will feel less tired and more energy during the day.  Status: Continued - Date: 3/8/2019, variable success, improved in the past month    Intervention(s)  Therapist will assign homework of social leisure planning and routine development; provide educational materials on what is self care; role-play assertiveness skills; teach emotional recognition/identification.     Goal 2: Client will better manage anxiety as evidenced by decreased score on LEYLA 7 from 5 to 0.     I will know I've met my goal when I have ewer anxious tendencies (talking loud, overly giggly and bright).      Objective #A (Client Action)    Client will learn 3 anxiety management strategies.  Status: Continued - Date: 3/8/2019    Intervention(s)  Therapist will assign homework of skill practice; provide educational materials on relaxation/mindfulness; role-play conflict management; teach the client how to perform a behavioral chain analysis.      Client has reviewed and agreed to the above plan.      Ning Montiel, [facility]   3/8/2019                                                 Sivan Mathew     SAFETY PLAN:  Step 1: Warning signs / cues (Thoughts, images, mood, situation, behavior) that a crisis may be developing:    Thoughts: \"I don't matter\", \"People would be better off without me - get on without me\", \"I'm a burden\", \"I can't do this anymore\", \"I wish I was dead\", \"It would benefit everyone if I was dead\", \"I wish I could freeze and take a breath\", \"This is how it's always going to be\", \"You're going to be unhappy in the future - nothing is going to change\"    Images: obsessive thoughts of death or dying - thinking about bridges, how people would react, what would happen to finances and other responsibilities attached to me    Thinking Processes: ruminations (can't stop thinking about my problems), racing thoughts, intrusive thoughts (bothersome, unwanted thoughts that come out of nowhere), highly " "critical and negative thoughts    Mood: worsening depression and disinhibited (not caring about things or consequences)    Behaviors: isolating/withdrawing , using alcohol, impulsive, reckless behaviors (acting without thinking), not taking care of myself and not taking care of my responsibilities, can't talk to people, can't give direct eye contact, can't process situations     Situations: public shame: over sharing then feeling bad when others react, relationship problems, school stress   Step 2: Coping strategies - Things I can do to take my mind off of my problems without contacting another person (relaxation technique, physical activity):    Distress Tolerance Strategies:  listen to positive and upbeat music, sensory based activities/self-soothe with five senses, watch a funny youtube videos, pray, paced breathing/progressive muscle relaxation, intense exercise for 2-3 minutes, ride light rail      Physical Activities: go for a walk, exercise - running, deep breathing, weight lifting    Focus on helpful thoughts:  \"I will get through this\", \"It always passes\", \"Ride the wave\", think about an affirmation/compliment someone has said to me - \"you're my favorite\"  Step 3: People and social settings that provide distraction:   Name: Wendy (friend) Phone: [phone]   Name: Ashlee (roommate) Phone: [phone]   Name: Cesario (brother)  Phone: [phone]    Safe places - movie theater, park, Jewish, work   Step 4: Remind myself of people and things that are important to me and worth living for: family, close friends, coworkers, future relationship/family/dog, freedom and money to travel, helping global warming, having a job that matters  Step 5: When I am in crisis, I can ask these people to help me use my safety plan:   Name: Wendy (friend) Phone: [phone]   Name: Ashlee (roommate) Phone: [phone]   Name: Cesario (brother)  Phone: [phone]  Step 6: Making the environment safe:     remove alcohol  Step " 7: Professionals or agencies I can contact during a crisis:    [facility] Daytime and After Hours Crisis Number: [phone]    Suicide Prevention Lifeline: 9-863-291-TUGJ (3660)    Crisis Text Line Service (available 24 hours a day, 7 days a week): Text [place] to 532246  Local Crisis Services: [facility] -- [phone]    Call 911 or go to my nearest emergency department.   I helped develop this safety plan and agree to use it when needed.  I have been given a copy of this plan.      Client signature _________________________________________________________________  Today s date:  6/5/2018  Adapted from Safety Plan Template 2008 Lisette Dorman and Joe Mcnally is reprinted with the express permission of the authors.  No portion of the Safety Plan Template may be reproduced without the express, written permission.  You can contact the authors at [email or web address] or [email or web address].

## 2019-06-04 ASSESSMENT — ANXIETY QUESTIONNAIRES: GAD7 TOTAL SCORE: 10

## 2019-06-07 DIAGNOSIS — F41.1 GENERALIZED ANXIETY DISORDER: ICD-10-CM

## 2019-06-07 DIAGNOSIS — F33.1 MAJOR DEPRESSIVE DISORDER, RECURRENT, MODERATE (H): ICD-10-CM

## 2019-06-07 NOTE — TELEPHONE ENCOUNTER
"Requested Prescriptions   Pending Prescriptions Disp Refills     FLUoxetine (PROZAC) 40 MG capsule [Pharmacy Med Name: FLUOXETINE HCL 40 MG CAPSULE]  DISCONTINUED        Last Written Prescription Date:  3/13/2019  Last Fill Quantity: 90 capsule,   # refills: 0  Last Office Visit: 5/14/2019    Future Office visit:    Next 5 appointments (look out 90 days)    Jul 01, 2019  5:30 PM CDT  Return Visit with Ning Montiel [facility] ([address] [address]  [phone]   Jul 25, 2019  5:30 PM CDT  Return Visit with Ning Montiel [address] [address]  [phone]           Routing refill request to provider for review/approval because:  Drug not active on patient's medication list   90 capsule 0     Sig: TAKE 1 CAPSULE BY MOUTH EVERY DAY       SSRIs Protocol Failed - 6/7/2019 10:55 AM        Failed - PHQ-9 score less than 5 in past 6 months     Please review last PHQ-9 score.   PHQ-9 SCORE 3/29/2019 5/14/2019 6/3/2019   PHQ-9 Total Score - - -   PHQ-9 Total Score MyChart - - -   PHQ-9 Total Score 16 12 14     LEYLA-7 SCORE 3/29/2019 5/14/2019 6/3/2019   Total Score - - -   Total Score 11 9 10           Passed - Medication is active on med list        Passed - Patient is age 18 or older        Passed - No active pregnancy on record        Passed - No positive pregnancy test in last 12 months        Passed - Recent (6 mo) or future (30 days) visit within the authorizing provider's specialty     Patient had office visit in the last 6 months or has a visit in the next 30 days with authorizing provider or within the authorizing provider's specialty.  See \"Patient Info\" tab in inbasket, or \"Choose Columns\" in Meds & Orders section of the refill encounter.              "

## 2019-06-11 RX ORDER — FLUOXETINE 40 MG/1
CAPSULE ORAL
Qty: 90 CAPSULE | Refills: 0 | OUTPATIENT
Start: 2019-06-11

## 2019-06-11 NOTE — TELEPHONE ENCOUNTER
Per chart review, the 40mg capsules had been discontinued on 5/14/19. Dose increased to three 20mg capsules at that time.  Med request refused with note to pharmacy.    Fiona Lucero RN

## 2019-06-24 ENCOUNTER — MYC MEDICAL ADVICE (OUTPATIENT)
Dept: PSYCHOLOGY | Facility: CLINIC | Age: 19
End: 2019-06-24

## 2019-09-05 ENCOUNTER — FCC EXTENDED DOCUMENTATION (OUTPATIENT)
Dept: PSYCHOLOGY | Facility: CLINIC | Age: 19
End: 2019-09-05

## 2019-09-05 NOTE — PROGRESS NOTES
"                    Discharge Summary  Multiple Sessions    Client Name: Sivan Mathew MRN#: [id number] YOB: 2000      Intake / Discharge Date: 9/5/2019      DSM5 Diagnoses: (Sustained by DSM5 Criteria Listed Above)  Diagnoses: 300.02 (F41.1) Generalized Anxiety Disorder & 296.32 (F33.1) Major Depressive Disorder, Recurrent Episode, Moderate; Hx Anorexia Nervosa; RULE OUT Alcohol Use Disorder  Psychosocial & Contextual Factors: School stress, relationship stress  WHODAS: 16          Presenting Concern:  Client reports the reason for seeking therapy at this time as \"increased anxiety, just want to talk to someone\". Also endorsed depressive symptoms, SI (feeling like she is wasting her time in college - no activities, lacking friendships) as well as relationship problems - first gf broke up with her Sept 2017.      Reason for Discharge:  [facility] to [facility].      Disposition at Time of Last Encounter:   Comments:   Ongoing learning about herself and symptoms; reported limited insight and denial interfering with her ability to be honest with herself and others and therefore being surprised when she realizes how much she is struggling (e.g., was taken back by therapist's reflection that this past semester has been the most difficult for her); improvements due to connecting with a friend re: how emotionally out of touch she is with her body and how she needs to be more accepting of herself - reported this caused a shift in her ability to risk take and have more compassion for herself, allowing herself to dress a certain way, more confidence with body imagine.     Risk Management:     Client reports the following current fears or concerns for personal safety: lives in off campus housing.  Client reports the following current or recent suicidal ideation or behaviors: onset - middle school, reports low self esteem, weight issues, and experienced bullying; states SI comes in waves and continued into " "college - no SI freshman year, but sophmore year SI has worsened - hopelessness for the future, \"it's not worth it, what's the point, you haven't done anything, you won't do anything, accomplish anything\"; denies attempts, hospitalizations, plans; hardik by distracting, hanging out with friends, and getting \"really into something like watching a show\".   1/28/2019 update - acknowledged patterns of low self worth and engaging in SIB for punishment and emotion regulation (not eating, picking at her nails until they bleed, hair pulling, not taking medications).   Client denies current or recent homicidal ideation or behaviors.  Client reports current or recent self injurious behavior or ideation including onset - high school, superficial cutting, no scars, last self-harm Sept 2017.   Client denies other safety concerns.  A safety and risk management plan has been developed at this time, see below  Client reports there are no firearms in the house.      Referred To:  PCP and [facility].        SELWYN Centeno   9/5/2019  "

## 2019-09-30 ENCOUNTER — TELEPHONE (OUTPATIENT)
Dept: FAMILY MEDICINE | Facility: CLINIC | Age: 19
End: 2019-09-30

## 2019-09-30 NOTE — LETTER
October 16, 2019      Sivan Mathew  [address]  [facility] [address]          Dear Sivan,    We have tried to contact you about your health, but have been unable to reach you.  Please call us as soon as possible so we can provide you with the best care possible.  We will continue to check in with you throughout the year to complete these items of care, if you are not able to complete these items at this time.  If you would like to complete the missing items for your care, please contact us at [phone].    We recommend the following:  - Schedule a PHYSICAL EXAM / WELLNESS APPOINTMENT. If you go elsewhere for these visits, please disregard this message  - Schedule a Cervical Cancer Screening (PAP SMEAR EXAM) which is due.  Please disregard this reminder if you have had this exam elsewhere within the last year.  It would be helpful for us to have a copy of your recent pap smear report in our file so that we can best coordinate your care.  - Please complete the enclosed PHQ9 and mail back to the clinic in the envelope provided. We care about you and want to make sure you get the best care possible. If at any time you feel unsafe or have concerns about safety of others please take  immediate action by calling [phone], for Mental Health Crisis phone support 24 hours a day, 365 days per year. As always, you can also go the your local Emergency Room, or call 911 if you have immediate safety concerns.      Sincerely,     Your Care Team

## 2019-09-30 NOTE — TELEPHONE ENCOUNTER
Panel Management Review      Patient has the following on her problem list:     Depression / Dysthymia review    Measure:  Needs PHQ-9 score of 4 or less during index window.  Administer PHQ-9 and if score is 5 or more, send encounter to provider for next steps.    12 month remission PHQ-9 follow up date range: 7/14-11/11      PHQ-9 SCORE 3/29/2019 5/14/2019 6/3/2019   PHQ-9 Total Score - - -   PHQ-9 Total Score MyChart - - -   PHQ-9 Total Score 16 12 14       If PHQ-9 recheck is 5 or more, route to provider for next steps.    Patient is due for:  PHQ9      Composite cancer screening  Chart review shows that this patient is due/due soon for the following Pap Smear  Summary:    Patient is due/failing the following:   PAP, PHQ9 and PHYSICAL    Action needed:   Patient needs office visit for preventive care with pap. and Patient needs to do PHQ9.    Type of outreach:    Sent [employer] message.    Questions for provider review:    None                                                                                                                                    Saumya Mcnamara,        Chart routed to Care Team .

## 2019-10-02 ENCOUNTER — HEALTH MAINTENANCE LETTER (OUTPATIENT)
Age: 19
End: 2019-10-02

## 2019-10-16 NOTE — TELEPHONE ENCOUNTER
Patient has read [employer] message.    Letter mailed along with PHQ9    Catherine Lopez, Certified Medical Assistant (AAMA)

## 2019-10-30 ENCOUNTER — HEALTH MAINTENANCE LETTER (OUTPATIENT)
Age: 19
End: 2019-10-30

## 2021-01-15 ENCOUNTER — HEALTH MAINTENANCE LETTER (OUTPATIENT)
Age: 21
End: 2021-01-15

## 2021-09-04 ENCOUNTER — HEALTH MAINTENANCE LETTER (OUTPATIENT)
Age: 21
End: 2021-09-04

## 2022-02-19 ENCOUNTER — HEALTH MAINTENANCE LETTER (OUTPATIENT)
Age: 22
End: 2022-02-19

## 2022-08-23 NOTE — PROGRESS NOTES
Progress Note    Client Name: Sivan Mathew  Date: 9/6/2018         Service Type: Individual      Session Start Time: 3:35p  Session End Time: 4:15p      Session Length: 40 minutes     Session #: 8     Attendees: Client attended alone    Treatment Plan Last Reviewed: 8/9/2018  PHQ-9 / LEYLA-7 : 15 & 9       DATA      Progress Since Last Session (Related to Symptoms / Goals / Homework):   Symptoms: Stable, see Epic for PHQ 9 and LEYLA 7 updates     Homework: Completed and continued - client identify positive affirmations/cheerleading statements, schedule PCP appt for medications, and continue to work on active listening. By next appt, client will work on balancing schedule and communicating with friends about communication.       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     Treatment Objective(s) Addressed in This Session:    Client will learn 3 anxiety management strategies. Client will identify and practice 3 new strategies for dealing with strong emotions as they relate to SI. Client will feel less tired and more energy during the day.     Intervention:   CBT: identity and challenge self-defeating and maladaptive thoughts, self-talk, and behaviors, teach and reinforce proactive interpersonal communication and problem solving skills, identify emotions and  Patient does not require any testing at this time  He should take his iron supplement and start his Humira injections once he's 14 days from his +COVID test  His iron deficiency will take months to correct as his colitis gets under control  "potential underlying reasons/functions of emotions, teach assertiveness skills, reinforce self-awareness and proactive problem solving and help seeking behaviors, teach identifying self-affirmations; Motivational Interviewing: open-ended questions, affirmations, reflections, reinforce personal control and choices, challenge sustain talk, evoke change talk, reinforce natural coping skills, explore ambivalence and roadblocks to change, teach OARS and active listening skills; DBT: teach interpersonal effectiveness and dialectical thinking as it relates to relationships (self vs others), reinforce wise mind and dialectical thinking to address black and white thinking, teach active listening for effective communication          ASSESSMENT: Current Emotional / Mental Status (status of significant symptoms):   Risk status (Self / Other harm or suicidal ideation)   Client reports the following current fears or concerns for personal safety: lives in off campus housing.  Client reports the following current or recent suicidal ideation or behaviors: onset - middle school, reports low self esteem, weight issues, and experienced bullying; states SI comes in waves and continued into college - no SI freshman year, but this year SI has worsened - hopelessness for the future, \"it's not worth it, what's the point, you haven't done anything, you won't do anything, accomplish anything\"; denies attempts, hospitalizations, plans; hardik by distracting, hanging out with friends, and getting \"really into something like watching a show\".   Client denies current or recent homicidal ideation or behaviors.  Client reports current or recent self injurious behavior or ideation including onset - high school, superficial cutting, no scars, last self-harm Sept 2017.  Client denies other safety concerns.  A safety and risk management plan has been developed at this time, see below  Client reports there are no firearms in the " "house.     Appearance:   Appropriate    Eye Contact:   Good    Psychomotor Behavior: Normal    Attitude:   Cooperative    Orientation:   All   Speech    Rate / Production: Normal     Volume:  Normal    Mood:    \"Good\" Some anxiousness   Affect:    Appropriate Bright   Thought Content:  Rumination    Thought Form:  Coherent  Logical  Circumstantial   Insight:    Good     Medication Review:   No current psychiatric medications prescribed     Medication Compliance:   NA     Changes in Health Issues:   None reported     Chemical Use Review:   Substance Use: Chemical use reviewed, no active concerns identified      Tobacco Use: No current tobacco use     Collateral Reports Completed:   Not Applicable      PLAN: (Client Tasks / Therapist Tasks / Other)  Therapist will assign homework of using safety plan; role-play effective communication skills; teach emotional regulation skills. Continue to reinforce interpersonal effectiveness and self-awareness. Continue to reinforce active listening, self-care, and help seeking behaviors. Continue to reinforce self-care and effective communication to reduce negative self-image.         Ning Montiel [facility]                                                         ________________________________________________________________________    Treatment Plan    Client's Name: Sivan Mathew  YOB: 2000    Date: 6/5/2018    Diagnoses: 300.02 (F41.1) Generalized Anxiety Disorder & 296.32 (F33.1) Major Depressive Disorder, Recurrent Episode, Moderate; Hx Anorexia Nervosa  Psychosocial & Contextual Factors: School stress, relationship stress  WHODAS: 16    Referral / Collaboration:  Was/were discussed and client will pursue - see PCP for medication.    Anticipated number of session or this episode of care: 12      MeasurableTreatment Goal(s) related to diagnosis / functional impairment(s)  Goal 1: Client will improve mood as evidenced by decreased score on PHQ 9 from 10 (moderate) " "to 5 or less (mild).    I will know I've met my goal when I have fewer intrusive thoughts and more energy.      Objective #A (Client Action)    Client will identify and practice 3 new strategies for dealing with strong emotions as they relate to SI.  Status: New - Date: 6/5/2018     Intervention(s)  Therapist will assign homework of using safety plan; role-play effective communication skills; teach emotional regulation skills.    Objective #B  Client will feel less tired and more energy during the day.  Status: New - Date: 6/5/2018     Intervention(s)  Therapist will assign homework of social leisure planning and routine development; provide educational materials on what is self care; role-play assertiveness skills; teach emotional recognition/identification.     Goal 2: Client will better manage anxiety as evidenced by decreased score on LEYLA 7 from 5 to 0.     I will know I've met my goal when I have ewer anxious tendencies (talking loud, overly giggly and bright).      Objective #A (Client Action)    Client will learn 3 anxiety management strategies.  Status: New - Date: 6/5/2018     Intervention(s)  Therapist will assign homework of skill practice; provide educational materials on relaxation/mindfulness; role-play conflict management; teach the client how to perform a behavioral chain analysis.      Client has reviewed and agreed to the above plan.      Ning Montiel, [facility]  June 5, 2018                                                 Sivan Mathew     SAFETY PLAN:  Step 1: Warning signs / cues (Thoughts, images, mood, situation, behavior) that a crisis may be developing:    Thoughts: \"I don't matter\", \"People would be better off without me - get on without me\", \"I'm a burden\", \"I can't do this anymore\", \"I wish I was dead\", \"It would benefit everyone if I was dead\", \"I wish I could freeze and take a breath\", \"This is how it's always going to be\", \"You're going to be unhappy in the future - nothing is going to " "change\"    Images: obsessive thoughts of death or dying - thinking about bridges, how people would react, what would happen to finances and other responsibilities attached to me    Thinking Processes: ruminations (can't stop thinking about my problems), racing thoughts, intrusive thoughts (bothersome, unwanted thoughts that come out of nowhere), highly critical and negative thoughts    Mood: worsening depression and disinhibited (not caring about things or consequences)    Behaviors: isolating/withdrawing , using alcohol, impulsive, reckless behaviors (acting without thinking), not taking care of myself and not taking care of my responsibilities, can't talk to people, can't give direct eye contact, can't process situations     Situations: public shame: over sharing then feeling bad when others react, relationship problems, school stress   Step 2: Coping strategies - Things I can do to take my mind off of my problems without contacting another person (relaxation technique, physical activity):    Distress Tolerance Strategies:  listen to positive and upbeat music, sensory based activities/self-soothe with five senses, watch a Urban Gentleman videos, pray, paced breathing/progressive muscle relaxation, intense exercise for 2-3 minutes, ride light rail      Physical Activities: go for a walk, exercise - running, deep breathing, weight lifting    Focus on helpful thoughts:  \"I will get through this\", \"It always passes\", \"Ride the wave\", think about an affirmation/compliment someone has said to me - \"you're my favorite\"  Step 3: People and social settings that provide distraction:   Name: Wendy (friend) Phone: [phone]   Name: Ashlee (roommate) Phone: [phone]   Name: Cesario (brother)  Phone: [phone]    Safe places - movie theater, park, Mandaeism, work   Step 4: Remind myself of people and things that are important to me and worth living for: family, close friends, coworkers, future relationship/family/dog, " freedom and money to travel, helping global warming, having a job that matters  Step 5: When I am in crisis, I can ask these people to help me use my safety plan:   Name: Wendy (friend) Phone: [phone]   Name: Ashlee (roommate) Phone: [phone]   Name: Cesario (brother)  Phone: [phone]  Step 6: Making the environment safe:     remove alcohol  Step 7: Professionals or agencies I can contact during a crisis:    [facility] Daytime and After Hours Crisis Number: 842-731-4003    Suicide Prevention Lifeline: 7-202-116-TALK (8255)    Crisis Text Line Service (available 24 hours a day, 7 days a week): Text [place] to 447536  Local Crisis Services: [facility] -- [phone]    Call 911 or go to my nearest emergency department.   I helped develop this safety plan and agree to use it when needed.  I have been given a copy of this plan.      Client signature _________________________________________________________________  Today s date:  6/5/2018  Adapted from Safety Plan Template 2008 Lisette Dorman and Joe Mcnally is reprinted with the express permission of the authors.  No portion of the Safety Plan Template may be reproduced without the express, written permission.  You can contact the authors at [email or web address] or [email or web address].

## 2022-10-22 ENCOUNTER — HEALTH MAINTENANCE LETTER (OUTPATIENT)
Age: 22
End: 2022-10-22

## 2023-03-16 ENCOUNTER — LAB REQUISITION (OUTPATIENT)
Dept: LAB | Facility: CLINIC | Age: 23
End: 2023-03-16

## 2023-03-16 PROCEDURE — 88342 IMHCHEM/IMCYTCHM 1ST ANTB: CPT | Mod: TC | Performed by: DENTIST

## 2023-04-01 ENCOUNTER — HEALTH MAINTENANCE LETTER (OUTPATIENT)
Age: 23
End: 2023-04-01

## 2023-05-27 ENCOUNTER — OFFICE VISIT (OUTPATIENT)
Dept: URGENT CARE | Facility: URGENT CARE | Age: 23
End: 2023-05-27
Payer: COMMERCIAL

## 2023-05-27 VITALS
DIASTOLIC BLOOD PRESSURE: 77 MMHG | HEIGHT: 67 IN | WEIGHT: 208 LBS | OXYGEN SATURATION: 99 % | TEMPERATURE: 98.1 F | HEART RATE: 83 BPM | BODY MASS INDEX: 32.65 KG/M2 | SYSTOLIC BLOOD PRESSURE: 133 MMHG

## 2023-05-27 DIAGNOSIS — H10.32 ACUTE BACTERIAL CONJUNCTIVITIS OF LEFT EYE: Primary | ICD-10-CM

## 2023-05-27 PROCEDURE — 99203 OFFICE O/P NEW LOW 30 MIN: CPT | Performed by: PHYSICIAN ASSISTANT

## 2023-05-27 RX ORDER — POLYMYXIN B SULFATE AND TRIMETHOPRIM 1; 10000 MG/ML; [USP'U]/ML
1-2 SOLUTION OPHTHALMIC EVERY 4 HOURS
Qty: 5 ML | Refills: 0 | Status: SHIPPED | OUTPATIENT
Start: 2023-05-27 | End: 2023-06-03

## 2023-05-27 NOTE — PROGRESS NOTES
"SUBJECTIVE:  Chief Complaint:   Chief Complaint   Patient presents with     Urgent Care     Eye Problem     Pt in clinic to have eval for left eye irritation, discharge and redness.     History of Present Illness:  Sivan Mathew is a 23 year old female who presents complaining of moderate left eye discharge, mattering, redness for 1 day(s).   Onset/timing: gradual.    Associated Signs and Symptoms: none  Treatment measures tried include: none  Contact wearer : No    Past Medical History:   Diagnosis Date     Appendicitis - 8/20/11 9/19/2011     Depressive disorder 04/2014    is on Zoloft for anxiety and has a small amount of depressio     Current Outpatient Medications   Medication Sig Dispense Refill     buPROPion HCl (WELLBUTRIN PO)        levonorgestrel (MIRENA) 20 MCG/24HR IUD 1 each (20 mcg) by Intrauterine route once for 1 dose 1 each 0     trimethoprim-polymyxin b (POLYTRIM) 32524-2.1 UNIT/ML-% ophthalmic solution Place 1-2 drops Into the left eye every 4 hours for 7 days 5 mL 0     FLUoxetine (PROZAC) 20 MG capsule Take 3 capsules (60 mg) by mouth daily (Patient not taking: Reported on 5/27/2023) 270 capsule 1        ROS:  Review of systems negative except as stated above.    OBJECTIVE:  /77   Pulse 83   Temp 98.1  F (36.7  C) (Temporal)   Ht 1.702 m (5' 7\")   Wt 94.3 kg (208 lb)   SpO2 99%   BMI 32.58 kg/m    General: no acute distress  Eye exam: right eye normal lid, conjunctiva, cornea, pupil and fundus, left eye abnormal findings: conjunctivitis with erythema, discharge and matting noted.  Heart: NORMAL - regular rate and rhythm without murmur.  Lungs: normal and clear to auscultation    ASSESSMENT:  (H10.32) Acute bacterial conjunctivitis of left eye  (primary encounter diagnosis)  Plan: trimethoprim-polymyxin b (POLYTRIM) 42373-1.1         UNIT/ML-% ophthalmic solution      Red flags and emergent follow up discussed, and understood by patient  Follow up with PCP if symptoms worsen or " fail to improve

## 2024-06-02 ENCOUNTER — HEALTH MAINTENANCE LETTER (OUTPATIENT)
Age: 24
End: 2024-06-02

## 2025-06-14 ENCOUNTER — HEALTH MAINTENANCE LETTER (OUTPATIENT)
Age: 25
End: 2025-06-14